# Patient Record
Sex: FEMALE | Race: BLACK OR AFRICAN AMERICAN | NOT HISPANIC OR LATINO | Employment: UNEMPLOYED | URBAN - METROPOLITAN AREA
[De-identification: names, ages, dates, MRNs, and addresses within clinical notes are randomized per-mention and may not be internally consistent; named-entity substitution may affect disease eponyms.]

---

## 2023-09-04 ENCOUNTER — HOSPITAL ENCOUNTER (INPATIENT)
Facility: HOSPITAL | Age: 23
LOS: 9 days | Discharge: HOME | DRG: 885 | End: 2023-09-13
Attending: PSYCHIATRY & NEUROLOGY | Admitting: PSYCHIATRY & NEUROLOGY

## 2023-09-04 ENCOUNTER — HOSPITAL ENCOUNTER (EMERGENCY)
Facility: HOSPITAL | Age: 23
Discharge: PSYCHIATRIC OR SUBSTANCE USE FACILITY | End: 2023-09-04
Attending: STUDENT IN AN ORGANIZED HEALTH CARE EDUCATION/TRAINING PROGRAM

## 2023-09-04 VITALS
BODY MASS INDEX: 31.89 KG/M2 | SYSTOLIC BLOOD PRESSURE: 114 MMHG | DIASTOLIC BLOOD PRESSURE: 78 MMHG | HEIGHT: 66 IN | RESPIRATION RATE: 18 BRPM | HEART RATE: 95 BPM | OXYGEN SATURATION: 100 % | TEMPERATURE: 98 F | WEIGHT: 198.44 LBS

## 2023-09-04 DIAGNOSIS — Z00.8 MEDICAL CLEARANCE FOR PSYCHIATRIC ADMISSION: ICD-10-CM

## 2023-09-04 DIAGNOSIS — F23 ACUTE PSYCHOSIS: Primary | ICD-10-CM

## 2023-09-04 DIAGNOSIS — F29 PSYCHOSIS: ICD-10-CM

## 2023-09-04 DIAGNOSIS — R53.83 FATIGUE: ICD-10-CM

## 2023-09-04 LAB
ALBUMIN SERPL-MCNC: 4.5 G/DL (ref 3.5–5)
ALBUMIN/GLOB SERPL: 1.1 RATIO (ref 1.1–2)
ALP SERPL-CCNC: 61 UNIT/L (ref 40–150)
ALT SERPL-CCNC: 17 UNIT/L (ref 0–55)
AMPHET UR QL SCN: NEGATIVE
APAP SERPL-MCNC: <17.4 UG/ML (ref 17.4–30)
APPEARANCE UR: ABNORMAL
AST SERPL-CCNC: 23 UNIT/L (ref 5–34)
B-HCG FREE SERPL-ACNC: <2.42 MIU/ML
BACTERIA #/AREA URNS AUTO: ABNORMAL /HPF
BARBITURATE SCN PRESENT UR: NEGATIVE
BASOPHILS # BLD AUTO: 0.04 X10(3)/MCL
BASOPHILS NFR BLD AUTO: 0.5 %
BENZODIAZ UR QL SCN: NEGATIVE
BILIRUB SERPL-MCNC: 1.2 MG/DL
BILIRUB UR QL STRIP.AUTO: NEGATIVE
BUN SERPL-MCNC: 15.4 MG/DL (ref 7–18.7)
CALCIUM SERPL-MCNC: 9.9 MG/DL (ref 8.4–10.2)
CANNABINOIDS UR QL SCN: POSITIVE
CHLORIDE SERPL-SCNC: 105 MMOL/L (ref 98–107)
CK MB SERPL-MCNC: 2.6 NG/ML
CK SERPL-CCNC: 419 U/L (ref 29–168)
CO2 SERPL-SCNC: 24 MMOL/L (ref 22–29)
COCAINE UR QL SCN: NEGATIVE
COLOR UR: ABNORMAL
CREAT SERPL-MCNC: 0.86 MG/DL (ref 0.55–1.02)
EOSINOPHIL # BLD AUTO: 0.35 X10(3)/MCL (ref 0–0.9)
EOSINOPHIL NFR BLD AUTO: 3.9 %
ERYTHROCYTE [DISTWIDTH] IN BLOOD BY AUTOMATED COUNT: 13.2 % (ref 11.5–17)
ETHANOL SERPL-MCNC: <10 MG/DL
FENTANYL UR QL SCN: NEGATIVE
FLUAV AG UPPER RESP QL IA.RAPID: NOT DETECTED
FLUBV AG UPPER RESP QL IA.RAPID: NOT DETECTED
GFR SERPLBLD CREATININE-BSD FMLA CKD-EPI: >60 MLS/MIN/1.73/M2
GLOBULIN SER-MCNC: 4 GM/DL (ref 2.4–3.5)
GLUCOSE SERPL-MCNC: 99 MG/DL (ref 74–100)
GLUCOSE UR QL STRIP.AUTO: NORMAL
HCT VFR BLD AUTO: 39.3 % (ref 37–47)
HGB BLD-MCNC: 13.3 G/DL (ref 12–16)
HYALINE CASTS #/AREA URNS LPF: ABNORMAL /LPF
IMM GRANULOCYTES # BLD AUTO: 0.02 X10(3)/MCL (ref 0–0.04)
IMM GRANULOCYTES NFR BLD AUTO: 0.2 %
KETONES UR QL STRIP.AUTO: ABNORMAL
LEUKOCYTE ESTERASE UR QL STRIP.AUTO: NEGATIVE
LYMPHOCYTES # BLD AUTO: 1.94 X10(3)/MCL (ref 0.6–4.6)
LYMPHOCYTES NFR BLD AUTO: 21.9 %
MCH RBC QN AUTO: 31.3 PG (ref 27–31)
MCHC RBC AUTO-ENTMCNC: 33.8 G/DL (ref 33–36)
MCV RBC AUTO: 92.5 FL (ref 80–94)
MDMA UR QL SCN: NEGATIVE
MONOCYTES # BLD AUTO: 0.87 X10(3)/MCL (ref 0.1–1.3)
MONOCYTES NFR BLD AUTO: 9.8 %
MUCOUS THREADS URNS QL MICRO: ABNORMAL /LPF
NEUTROPHILS # BLD AUTO: 5.65 X10(3)/MCL (ref 2.1–9.2)
NEUTROPHILS NFR BLD AUTO: 63.7 %
NITRITE UR QL STRIP.AUTO: NEGATIVE
NRBC BLD AUTO-RTO: 0 %
OPIATES UR QL SCN: NEGATIVE
PCP UR QL: NEGATIVE
PH UR STRIP.AUTO: 6 [PH]
PH UR: 6 [PH] (ref 3–11)
PLATELET # BLD AUTO: 258 X10(3)/MCL (ref 130–400)
PMV BLD AUTO: 10.9 FL (ref 7.4–10.4)
POCT GLUCOSE: 89 MG/DL (ref 70–110)
POTASSIUM SERPL-SCNC: 3.7 MMOL/L (ref 3.5–5.1)
PROT SERPL-MCNC: 8.5 GM/DL (ref 6.4–8.3)
PROT UR QL STRIP.AUTO: ABNORMAL
RBC # BLD AUTO: 4.25 X10(6)/MCL (ref 4.2–5.4)
RBC #/AREA URNS AUTO: ABNORMAL /HPF
RBC UR QL AUTO: NEGATIVE
SALICYLATES SERPL-MCNC: <5 MG/DL (ref 15–30)
SARS-COV-2 RNA RESP QL NAA+PROBE: NOT DETECTED
SODIUM SERPL-SCNC: 141 MMOL/L (ref 136–145)
SP GR UR STRIP.AUTO: 1.04 (ref 1–1.03)
SQUAMOUS #/AREA URNS LPF: ABNORMAL /HPF
TSH SERPL-ACNC: 1.04 UIU/ML (ref 0.35–4.94)
UNIDENT CRYS #/AREA URNS HPF: ABNORMAL /HPF
UROBILINOGEN UR STRIP-ACNC: ABNORMAL
WBC # SPEC AUTO: 8.87 X10(3)/MCL (ref 4.5–11.5)
WBC #/AREA URNS AUTO: ABNORMAL /HPF

## 2023-09-04 PROCEDURE — 80307 DRUG TEST PRSMV CHEM ANLYZR: CPT | Performed by: PHYSICIAN ASSISTANT

## 2023-09-04 PROCEDURE — 82077 ASSAY SPEC XCP UR&BREATH IA: CPT | Performed by: PHYSICIAN ASSISTANT

## 2023-09-04 PROCEDURE — 99285 EMERGENCY DEPT VISIT HI MDM: CPT | Mod: 25

## 2023-09-04 PROCEDURE — 84702 CHORIONIC GONADOTROPIN TEST: CPT | Performed by: PHYSICIAN ASSISTANT

## 2023-09-04 PROCEDURE — 81001 URINALYSIS AUTO W/SCOPE: CPT | Performed by: PHYSICIAN ASSISTANT

## 2023-09-04 PROCEDURE — 80143 DRUG ASSAY ACETAMINOPHEN: CPT | Performed by: PHYSICIAN ASSISTANT

## 2023-09-04 PROCEDURE — 93005 ELECTROCARDIOGRAM TRACING: CPT

## 2023-09-04 PROCEDURE — 85025 COMPLETE CBC W/AUTO DIFF WBC: CPT | Performed by: PHYSICIAN ASSISTANT

## 2023-09-04 PROCEDURE — 0240U COVID/FLU A&B PCR: CPT | Performed by: PHYSICIAN ASSISTANT

## 2023-09-04 PROCEDURE — 11400000 HC PSYCH PRIVATE ROOM

## 2023-09-04 PROCEDURE — 82550 ASSAY OF CK (CPK): CPT | Performed by: PHYSICIAN ASSISTANT

## 2023-09-04 PROCEDURE — 80053 COMPREHEN METABOLIC PANEL: CPT | Performed by: PHYSICIAN ASSISTANT

## 2023-09-04 PROCEDURE — 84443 ASSAY THYROID STIM HORMONE: CPT | Performed by: PHYSICIAN ASSISTANT

## 2023-09-04 PROCEDURE — 25000003 PHARM REV CODE 250: Performed by: PHYSICIAN ASSISTANT

## 2023-09-04 PROCEDURE — 80179 DRUG ASSAY SALICYLATE: CPT | Performed by: PHYSICIAN ASSISTANT

## 2023-09-04 PROCEDURE — 82553 CREATINE MB FRACTION: CPT | Performed by: PHYSICIAN ASSISTANT

## 2023-09-04 PROCEDURE — 96360 HYDRATION IV INFUSION INIT: CPT

## 2023-09-04 RX ORDER — HALOPERIDOL 5 MG/ML
10 INJECTION INTRAMUSCULAR EVERY 4 HOURS PRN
Status: DISCONTINUED | OUTPATIENT
Start: 2023-09-04 | End: 2023-09-13 | Stop reason: HOSPADM

## 2023-09-04 RX ORDER — DIPHENHYDRAMINE HCL 50 MG
50 CAPSULE ORAL EVERY 4 HOURS PRN
Status: DISCONTINUED | OUTPATIENT
Start: 2023-09-04 | End: 2023-09-13 | Stop reason: HOSPADM

## 2023-09-04 RX ORDER — MAG HYDROX/ALUMINUM HYD/SIMETH 200-200-20
30 SUSPENSION, ORAL (FINAL DOSE FORM) ORAL EVERY 6 HOURS PRN
Status: DISCONTINUED | OUTPATIENT
Start: 2023-09-04 | End: 2023-09-13 | Stop reason: HOSPADM

## 2023-09-04 RX ORDER — DIPHENHYDRAMINE HYDROCHLORIDE 50 MG/ML
50 INJECTION INTRAMUSCULAR; INTRAVENOUS EVERY 4 HOURS PRN
Status: DISCONTINUED | OUTPATIENT
Start: 2023-09-04 | End: 2023-09-13 | Stop reason: HOSPADM

## 2023-09-04 RX ORDER — HALOPERIDOL 5 MG/1
10 TABLET ORAL EVERY 4 HOURS PRN
Status: DISCONTINUED | OUTPATIENT
Start: 2023-09-04 | End: 2023-09-13 | Stop reason: HOSPADM

## 2023-09-04 RX ORDER — HYDROXYZINE HYDROCHLORIDE 50 MG/1
50 TABLET, FILM COATED ORAL EVERY 4 HOURS PRN
Status: DISCONTINUED | OUTPATIENT
Start: 2023-09-04 | End: 2023-09-08

## 2023-09-04 RX ORDER — IBUPROFEN 200 MG
1 TABLET ORAL DAILY
Status: DISCONTINUED | OUTPATIENT
Start: 2023-09-05 | End: 2023-09-13 | Stop reason: HOSPADM

## 2023-09-04 RX ORDER — LORAZEPAM 2 MG/ML
2 INJECTION INTRAMUSCULAR EVERY 4 HOURS PRN
Status: DISCONTINUED | OUTPATIENT
Start: 2023-09-04 | End: 2023-09-13 | Stop reason: HOSPADM

## 2023-09-04 RX ORDER — TRAZODONE HYDROCHLORIDE 100 MG/1
100 TABLET ORAL NIGHTLY PRN
Status: DISCONTINUED | OUTPATIENT
Start: 2023-09-04 | End: 2023-09-08

## 2023-09-04 RX ORDER — ACETAMINOPHEN 325 MG/1
650 TABLET ORAL EVERY 6 HOURS PRN
Status: DISCONTINUED | OUTPATIENT
Start: 2023-09-04 | End: 2023-09-13 | Stop reason: HOSPADM

## 2023-09-04 RX ORDER — LORAZEPAM 1 MG/1
2 TABLET ORAL EVERY 4 HOURS PRN
Status: DISCONTINUED | OUTPATIENT
Start: 2023-09-04 | End: 2023-09-13 | Stop reason: HOSPADM

## 2023-09-04 RX ADMIN — SODIUM CHLORIDE 1000 ML: 9 INJECTION, SOLUTION INTRAVENOUS at 12:09

## 2023-09-04 NOTE — ED PROVIDER NOTES
Encounter Date: 9/4/2023       History     Chief Complaint   Patient presents with    Dehydration     Pt brought in by EMS after being found sleeping under a tree. Pt reports she feels dehydrated and fatigued.      Patient reports to the ER with reports of fatigue and feeling dehydrated (per EMS)    The history is provided by the patient and the EMS personnel.   Illness   The current episode started 3 to 5 hours ago. Pertinent negatives include no fever, no vomiting, no headaches, no neck stiffness, no shortness of breath, no rash and no pain. She has received no recent medical care.     Review of patient's allergies indicates:  No Known Allergies  No past medical history on file.  No past surgical history on file.  No family history on file.     Review of Systems   Constitutional:  Negative for fever.   HENT:  Negative for trouble swallowing.    Eyes: Negative.  Negative for pain.   Respiratory:  Negative for shortness of breath.    Cardiovascular:  Negative for chest pain.   Gastrointestinal:  Negative for vomiting.   Genitourinary:  Negative for hematuria.   Musculoskeletal:  Negative for back pain.   Skin:  Negative for rash.   Neurological:  Negative for headaches.   Hematological:  Does not bruise/bleed easily.   Psychiatric/Behavioral:  The patient is not hyperactive.        Physical Exam     Initial Vitals [09/04/23 1055]   BP Pulse Resp Temp SpO2   (!) 150/76 78 16 98.2 °F (36.8 °C) 100 %      MAP       --         Physical Exam    Vitals reviewed.  Constitutional: Vital signs are normal. She appears well-developed and well-nourished. She is uncooperative.   HENT:   Head: Normocephalic and atraumatic.   Eyes: Conjunctivae and EOM are normal. Pupils are equal, round, and reactive to light.   Neck: Neck supple.   Normal range of motion.  Cardiovascular:  Normal rate, regular rhythm and normal heart sounds.           Pulmonary/Chest: Breath sounds normal. No respiratory distress. She has no wheezes. She  "exhibits no tenderness.   Abdominal: Abdomen is soft. Bowel sounds are normal. There is no abdominal tenderness.   Musculoskeletal:         General: Normal range of motion.      Cervical back: Normal range of motion and neck supple.     Neurological: She is alert and oriented to person, place, and time. She displays normal reflexes. No cranial nerve deficit or sensory deficit.   Skin: Skin is warm and dry.   Psychiatric: Judgment normal. Her speech is tangential. She expresses no homicidal ideation. She expresses no homicidal plans.   Patient is uncooperative during HPI and PE, not answering questions and repeatedly saying she needs fluids for her dehydration; when asked what symptoms or complaints she is having to make her think she is dehydrated and requires fluids, pt is unable to answer basic HPI and PE questions repeatedly responding "you should know, you are the doctor" and then she begins to mutter things under her breath as if she is having a conversation with individuals not in the room She is inattentive.       ED Course   Procedures  Labs Reviewed   COMPREHENSIVE METABOLIC PANEL - Abnormal; Notable for the following components:       Result Value    Protein Total 8.5 (*)     Globulin 4.0 (*)     All other components within normal limits   URINALYSIS, REFLEX TO URINE CULTURE - Abnormal; Notable for the following components:    Color, UA Orange (*)     Appearance, UA Turbid (*)     Specific Gravity, UA 1.041 (*)     Protein, UA 3+ (*)     Ketones, UA 1+ (*)     Urobilinogen, UA 1+ (*)     Squamous Epithelial Cells, UA Moderate (*)     Mucous, UA Many (*)     Unclassified Crystal, UA Trace (*)     RBC, UA 6-10 (*)     All other components within normal limits   DRUG SCREEN, URINE (BEAKER) - Abnormal; Notable for the following components:    Cannabinoids, Urine Positive (*)     All other components within normal limits    Narrative:     Cut off concentrations:    Amphetamines - 1000 ng/ml  Barbiturates - 200 " ng/ml  Benzodiazepine - 200 ng/ml  Cannabinoids (THC) - 50 ng/ml  Cocaine - 300 ng/ml  Fentanyl - 1.0 ng/ml  MDMA - 500 ng/ml  Opiates - 300 ng/ml   Phencyclidine (PCP) - 25 ng/ml    Specimen submitted for drug analysis and tested for pH and specific gravity in order to evaluate sample integrity. Suspect tampering if specific gravity is <1.003 and/or pH is not within the range of 4.5 - 8.0  False negatives may result form substances such as bleach added to urine.  False positives may result for the presence of a substance with similar chemical structure to the drug or its metabolite.    This test provides only a PRELIMINARY analytical test result. A more specific alternate chemical method must be used in order to obtain a confirmed analytical result. Gas chromatography/mass spectrometry (GC/MS) is the preferred confirmatory method. Other chemical confirmation methods are available. Clinical consideration and professional judgement should be applied to any drug of abuse test result, particularly when preliminary positive results are used.    Positive results will be confirmed only at the physicians request. Unconfirmed screening results are to be used only for medical purposes (treatment).        ACETAMINOPHEN LEVEL - Abnormal; Notable for the following components:    Acetaminophen Level <17.4 (*)     All other components within normal limits   SALICYLATE LEVEL - Abnormal; Notable for the following components:    Salicylate Level <5.0 (*)     All other components within normal limits   CK - Abnormal; Notable for the following components:    Creatine Kinase 419 (*)     All other components within normal limits   CBC WITH DIFFERENTIAL - Abnormal; Notable for the following components:    MCH 31.3 (*)     MPV 10.9 (*)     All other components within normal limits   TSH - Normal   ALCOHOL,MEDICAL (ETHANOL) - Normal   HCG, QUANTITATIVE - Normal   CK-MB - Normal   COVID/FLU A&B PCR - Normal    Narrative:     The Xpert  Xpress SARS-CoV-2/FLU/RSV plus is a rapid, multiplexed real-time PCR test intended for the simultaneous qualitative detection and differentiation of SARS-CoV-2, Influenza A, Influenza B, and respiratory syncytial virus (RSV) viral RNA in either nasopharyngeal swab or nasal swab specimens.         CBC W/ AUTO DIFFERENTIAL    Narrative:     The following orders were created for panel order CBC auto differential.  Procedure                               Abnormality         Status                     ---------                               -----------         ------                     CBC with Differential[[id number]]        Abnormal            Final result                 Please view results for these tests on the individual orders.   EXTRA TUBES    Narrative:     The following orders were created for panel order EXTRA TUBES.  Procedure                               Abnormality         Status                     ---------                               -----------         ------                     Light Blue Top Hold[[id number]]                              In process                   Please view results for these tests on the individual orders.   LIGHT BLUE TOP HOLD   POCT GLUCOSE, HAND-HELD DEVICE   POCT GLUCOSE     EKG Readings: (Independently Interpreted)   Initial Reading: No STEMI. Rhythm: Normal Sinus Rhythm. Heart Rate: 91. Ectopy: No Ectopy. Conduction: Normal. ST Segments: Normal ST Segments. T Waves: Normal. Clinical Impression: Normal Sinus Rhythm     ECG Results              EKG 12-lead (In process)  Result time 09/04/23 14:40:03      In process by Interface, Lab [facility] (09/04/23 14:40:03)                   Narrative:    Test Reason : R53.83,    Vent. Rate : 091 BPM     Atrial Rate : 091 BPM     P-R Int : 148 ms          QRS Dur : 070 ms      QT Int : 358 ms       P-R-T Axes : 063 076 043 degrees     QTc Int : 440 ms    Normal sinus rhythm  Normal ECG  No previous ECGs available    Referred By:  [email or web address]   SELF           Confirmed By:                                   Imaging Results              CT Head Without Contrast (Final result)  Result time 09/04/23 14:19:26      Final result by Billy Tapia MD (09/04/23 14:19:26)                   Impression:      No acute abnormality seen      Electronically signed by: Billy Tapia  Date:    09/04/2023  Time:    14:19               Narrative:    EXAMINATION:  CT HEAD WITHOUT CONTRAST    CLINICAL HISTORY:  Mental status change, unknown cause;    TECHNIQUE:  Multiple axial images were obtained from the base of the brain to the vertex without contrast administration.  Sagittal and coronal reconstructions were performed. .Automatic exposure control  (AEC) is utilized to reduce patient radiation exposure.    COMPARISON:  None    FINDINGS:  There is no intracranial mass or lesion seen.  No hemorrhage is seen.  No infarct is seen.  The ventricles and basilar cisterns appear normal.  Brain parenchyma appears grossly unremarkable.    Posterior fossa appears normal.  The calvarium is intact.  The paranasal sinuses appear grossly unremarkable.                                       Medications   sodium chloride 0.9% bolus 1,000 mL 1,000 mL (0 mLs Intravenous Stopped 9/4/23 1323)     Medical Decision Making  Amount and/or Complexity of Data Reviewed  Labs: ordered.  Radiology: ordered.         APC / Resident Notes:   Attending attestation.  I agree with Michael's assessment/plan as listed above.  He consulted me on patient with bizarre behavior with grossly normal laboratory workup    History.  Patient provides very poor history.  No friends or family present.  Believes she is dehydrated and stating this is because she did not eat or drink today.  And goes for long periods not answering questions and hallucinating.  No other complaints or concerns at this time.  No trauma reported, no fever chills, no chest pain, etc.    Physical exam.  General: Well  appearing, nontoxic, no acute distress. Resting upon entry.  Head: Normocephalic Atraumatic  Eyes: EOMI  ENT: Airway patent, no stridor  Neck: supple  Chest: Lungs CTAB, no respiratory distress  Cardiac: RRR, no murmurs, rubs or gallops  Abdomen: soft, no rebound / guarding / rigidity to deep palpation.  Musculoskeletal: LE grossly symmetric, nontender  Skin: Normal skin tone  Neuro: No obvious focal deficit, responding to internal stimuli, stares off, one sided conversations.    Assessment/plan.  Patient at this time is grossly disabled.  Unable to give a clear history or actual meaningful conversation with the patient.  Has no focal neuro deficits.  Vitals stable.  Resting comfortably upon my initial examination.  Ultimately performed head CT given acute altered status which returned neg.  Mild elevation of CPK but noncritical rhabdo.  No anion gap, no evidence of infection, no nuchal rigidity.  Patient at this time requires psychiatric evaluation.  PEC'd for patient's safety.  Awaiting acceptance and transfer. (Janak)        ED Course as of 09/04/23 1539   Mon Sep 04, 2023   1345 Transitioned to Dr Briceno [AL]      ED Course User Index  [AL] Michael Queen PA                    Clinical Impression:   Final diagnoses:  [R53.83] Fatigue  [F23] Acute psychosis (Primary)  [Z00.8] Medical clearance for psychiatric admission        ED Disposition Condition    Transfer to Psych Facility Stable          ED Prescriptions    None       Follow-up Information    None          Michael Queen PA  09/04/23 1411       Michael Queen PA  09/04/23 1416       Bridger Briceno MD  09/04/23 [id number]

## 2023-09-05 PROCEDURE — 11400000 HC PSYCH PRIVATE ROOM

## 2023-09-05 PROCEDURE — 25000003 PHARM REV CODE 250: Performed by: PSYCHIATRY & NEUROLOGY

## 2023-09-05 RX ORDER — OLANZAPINE 2.5 MG/1
2.5 TABLET ORAL NIGHTLY
Status: DISCONTINUED | OUTPATIENT
Start: 2023-09-05 | End: 2023-09-13 | Stop reason: HOSPADM

## 2023-09-05 RX ADMIN — HYDROXYZINE HYDROCHLORIDE 50 MG: 50 TABLET, FILM COATED ORAL at 06:09

## 2023-09-05 RX ADMIN — HYDROXYZINE HYDROCHLORIDE 50 MG: 50 TABLET, FILM COATED ORAL at 11:09

## 2023-09-05 NOTE — NURSING
"Daily Nursing Note:      Behavior:    Patient (Gilmer Carmona is a 23 y.o. female, : 2000, MRN: [id number]) demonstrating an affect that was flat and irritable. Gilmer demonstrating mood that is anxious. Gilmer had an appearance that was disheveled and poor hygiene. Gilmer denies suicidal ideation. Gilmer denies suicide plan. Gilmer denies homicidal ideation. Gilmer endorses auditory  hallucinations.    Gilmer's  height is 5' 6" (1.676 m) and weight is 90 kg (198 lb 6.6 oz). Her oral temperature is 98.7 °F (37.1 °C). Her blood pressure is 104/71 and her pulse is 83. Her respiration is 18 and oxygen saturation is 98%.     Kristians last BM was noted on: _2023  ______      Intervention:    Encourage Gilmer to perform self-hygiene, grooming, and changing of clothing. Monitor Gilmer's behavior and program compliance. Monitor Gilmer for suicidal ideation, homicidal ideation, sleep disturbance, and hallucinations. Encourage Gilmer to eat all portions of meals and assess for meal preferences. Monitor Gilmer for intake and output to ensure hydration. Notify the Physician/Physician Assistant/Advance Practice Registered Nurse (MD/PA/APRN) for any medication refusal and any change in patient condition.      Response:    Gilmer verbalizes understand of unit process and procedures. Gilmer reported medications  No mediations.  Attempted to educate on medications prescribed to her, refused teaching.  Stated she doesn't want to start the new medications. ______.      Plan:     Continue to monitor per MD/PA/APRN orders; maintain patient safety.    "

## 2023-09-05 NOTE — NURSING
"PRN Administration Note:    Behavior:    Patient (Gilmer Carmona is a 23 y.o. female, : 2000, MRN: [id number])     Allergies: Patient has no known allergies.    Gilmer's  height is 5' 6" (1.676 m) and weight is 90 kg (198 lb 6.6 oz). Her oral temperature is 98.7 °F (37.1 °C). Her blood pressure is 104/71 and her pulse is 83. Her respiration is 18 and oxygen saturation is 98%.     Reason for PRN Administration: Complaints of anxiety. _________.    Intervention:    Administered _ Atarax 50 mg.,______ per physician order to Gilmer       Response:    Gilmer tolerated administration well.      Plan:     Continue to monitor per MD/PA/APRN orders; and reevaluate medication effectiveness within 30 minutes.    "

## 2023-09-05 NOTE — NURSING
"PRN Medication Follow-up Note:    Behavior:    Patient (Gilmer Carmona is a 23 y.o. female, : 2000, MRN: [id number])     Allergies: Patient has no known allergies.    Gilmer's  height is 5' 6" (1.676 m) and weight is 90 kg (198 lb 6.6 oz). Her oral temperature is 98.7 °F (37.1 °C). Her blood pressure is 104/71 and her pulse is 83. Her respiration is 18 and oxygen saturation is 98%.     Administered _ Atarax 50 mg.,______ per physician order to Gilmer       Intervention:    Intervention to Gilmer's response: Administer medications as ordered. ________.       Response:    Gilmer's response: Decreasing anxiety. ________      Plan:     Continue to monitor per MD/PA/APRN orders; and reevaluate medication effectiveness within 30 minutes.    "

## 2023-09-05 NOTE — NURSING
"PRN Medication Follow-up Note:    Behavior:    Patient (Gilmer Carmona is a 23 y.o. female, : 2000, MRN: [id number])     Allergies: Patient has no known allergies.    Gilmer's  height is 5' 6" (1.676 m) and weight is 89.8 kg (198 lb). Her temperature is 97.9 °F (36.6 °C). Her blood pressure is 106/73 and her pulse is 78. Her respiration is 18 and oxygen saturation is 98%.     Administered _ Atarax 50 mg.,______ per physician order to Gilmer       Intervention:    Intervention to Gilmer's response: Administer medication as ordered. _________.       Response:    Gilmer's response: Decreasing anxiety._________      Plan:     Continue to monitor per MD/PA/APRN orders; and reevaluate medication effectiveness within 30 minutes.    "

## 2023-09-05 NOTE — H&P
[facility]  History & Physical    Subjective:      Chief Complaint/Reason for Admission: psychosis with hallucinations     Gilmer Carmona is a 23 y.o. female. Sent on PEC from ER with hallucinations     No past medical history on file.  No past surgical history on file.  No family history on file.       No medications prior to admission.     Review of patient's allergies indicates:  No Known Allergies     Review of Systems   Constitutional: Negative.    HENT: Negative.     Eyes: Negative.    Respiratory: Negative.     Cardiovascular: Negative.    Gastrointestinal: Negative.    Genitourinary: Negative.    Musculoskeletal: Negative.    Skin: Negative.    Neurological: Negative.    Endo/Heme/Allergies: Negative.    Psychiatric/Behavioral:  Positive for depression and hallucinations. Negative for substance abuse and suicidal ideas.        Objective:      Vital Signs (Most Recent)  Temp: 97.9 °F (36.6 °C) (09/05/23 1637)  Pulse: 78 (09/05/23 1637)  Resp: 18 (09/05/23 1637)  BP: 106/73 (09/05/23 1637)  SpO2: 98 % (09/05/23 1100)    Vital Signs Range (Last 24H):  Temp:  [97.9 °F (36.6 °C)-98.7 °F (37.1 °C)]   Pulse:  []   Resp:  [18]   BP: (104-127)/(70-85)   SpO2:  [98 %-100 %]     Physical Exam  HENT:      Head: Normocephalic.      Right Ear: Tympanic membrane normal.      Left Ear: Tympanic membrane normal.      Nose: Nose normal.      Mouth/Throat:      Mouth: Mucous membranes are moist.   Eyes:      Extraocular Movements: Extraocular movements intact.      Pupils: Pupils are equal, round, and reactive to light.   Cardiovascular:      Rate and Rhythm: Normal rate and regular rhythm.   Pulmonary:      Effort: Pulmonary effort is normal.   Abdominal:      General: Abdomen is flat.   Musculoskeletal:         General: Normal range of motion.   Skin:     General: Skin is warm.   Neurological:      General: No focal deficit present.      Mental Status: She is alert and oriented to  person, place, and time.      Comments: Vision normal   Hearing normal   EOM intact   Face muscles normal  Facial sensation normal   Shrugs shoulders  Tongue midline            Data Review:    Recent Results (from the past 48 hour(s))   POCT glucose    Collection Time: 09/04/23 11:53 AM   Result Value Ref Range    POCT Glucose 89 70 - 110 mg/dL   COVID/FLU A&B PCR    Collection Time: 09/04/23 12:03 PM   Result Value Ref Range    Influenza A PCR Not Detected Not Detected    Influenza B PCR Not Detected Not Detected    SARS-CoV-2 PCR Not Detected Not Detected, Negative, Invalid   Comprehensive metabolic panel    Collection Time: 09/04/23 12:14 PM   Result Value Ref Range    Sodium Level 141 136 - 145 mmol/L    Potassium Level 3.7 3.5 - 5.1 mmol/L    Chloride 105 98 - 107 mmol/L    Carbon Dioxide 24 22 - 29 mmol/L    Glucose Level 99 74 - 100 mg/dL    Blood Urea Nitrogen 15.4 7.0 - 18.7 mg/dL    Creatinine 0.86 0.55 - 1.02 mg/dL    Calcium Level Total 9.9 8.4 - 10.2 mg/dL    Protein Total 8.5 (H) 6.4 - 8.3 gm/dL    Albumin Level 4.5 3.5 - 5.0 g/dL    Globulin 4.0 (H) 2.4 - 3.5 gm/dL    Albumin/Globulin Ratio 1.1 1.1 - 2.0 ratio    Bilirubin Total 1.2 <=1.5 mg/dL    Alkaline Phosphatase 61 40 - 150 unit/L    Alanine Aminotransferase 17 0 - 55 unit/L    Aspartate Aminotransferase 23 5 - 34 unit/L    eGFR >60 mls/min/1.73/m2   TSH    Collection Time: 09/04/23 12:14 PM   Result Value Ref Range    Thyroid Stimulating Hormone 1.037 0.350 - 4.940 uIU/mL   Ethanol    Collection Time: 09/04/23 12:14 PM   Result Value Ref Range    Ethanol Level <10.0 <=10.0 mg/dL   Acetaminophen level    Collection Time: 09/04/23 12:14 PM   Result Value Ref Range    Acetaminophen Level <17.4 (L) 17.4 - 30.0 ug/ml   Salicylate level    Collection Time: 09/04/23 12:14 PM   Result Value Ref Range    Salicylate Level <5.0 (L) 15.0 - 30.0 mg/dL   hCG, quantitative, pregnancy    Collection Time: 09/04/23 12:14 PM   Result Value Ref Range    Beta Human  Chorionic Gonadotropin Quantitative <2.42 <=5.00 mIU/mL   CPK    Collection Time: 09/04/23 12:14 PM   Result Value Ref Range    Creatine Kinase 419 (H) 29 - 168 U/L   CK-MB    Collection Time: 09/04/23 12:14 PM   Result Value Ref Range    Creatine Kinase MB 2.6 <=3.4 ng/mL   CBC with Differential    Collection Time: 09/04/23 12:14 PM   Result Value Ref Range    WBC 8.87 4.50 - 11.50 x10(3)/mcL    RBC 4.25 4.20 - 5.40 x10(6)/mcL    Hgb 13.3 12.0 - 16.0 g/dL    Hct 39.3 37.0 - 47.0 %    MCV 92.5 80.0 - 94.0 fL    MCH 31.3 (H) 27.0 - 31.0 pg    MCHC 33.8 33.0 - 36.0 g/dL    RDW 13.2 11.5 - 17.0 %    Platelet 258 130 - 400 x10(3)/mcL    MPV 10.9 (H) 7.4 - 10.4 fL    Neut % 63.7 %    Lymph % 21.9 %    Mono % 9.8 %    Eos % 3.9 %    Basophil % 0.5 %    Lymph # 1.94 0.6 - 4.6 x10(3)/mcL    Neut # 5.65 2.1 - 9.2 x10(3)/mcL    Mono # 0.87 0.1 - 1.3 x10(3)/mcL    Eos # 0.35 0 - 0.9 x10(3)/mcL    Baso # 0.04 <=0.2 x10(3)/mcL    IG# 0.02 0 - 0.04 x10(3)/mcL    IG% 0.2 %    NRBC% 0.0 %   Urinalysis, Reflex to Urine Culture    Collection Time: 09/04/23 12:18 PM    Specimen: Urine   Result Value Ref Range    Color, UA Orange (A) Yellow, Light-Yellow, Dark Yellow, Claudia, Straw    Appearance, UA Turbid (A) Clear    Specific Gravity, UA 1.041 (H) 1.005 - 1.030    pH, UA 6.0 5.0 - 8.5    Protein, UA 3+ (A) Negative    Glucose, UA Normal Negative, Normal    Ketones, UA 1+ (A) Negative    Blood, UA Negative Negative    Bilirubin, UA Negative Negative    Urobilinogen, UA 1+ (A) 0.2, 1.0, Normal    Nitrites, UA Negative Negative    Leukocyte Esterase, UA Negative Negative    WBC, UA 0-5 None Seen, 0-2, 3-5, 0-5 /HPF    Bacteria, UA None Seen None Seen /HPF    Squamous Epithelial Cells, UA Moderate (A) None Seen /HPF    Mucous, UA Many (A) None Seen /LPF    Hyaline Casts, UA None Seen None Seen /lpf    Unclassified Crystal, UA Trace (A) None Seen /HPF    RBC, UA 6-10 (A) None Seen, 0-2, 3-5, 0-5 /HPF   Drug Screen, Urine    Collection  Time: 09/04/23 12:18 PM   Result Value Ref Range    Amphetamines, Urine Negative Negative    Barbituates, Urine Negative Negative    Benzodiazepine, Urine Negative Negative    Cannabinoids, Urine Positive (A) Negative    Cocaine, Urine Negative Negative    Fentanyl, Urine Negative Negative    MDMA, Urine Negative Negative    Opiates, Urine Negative Negative    Phencyclidine, Urine Negative Negative    pH, Urine 6.0 3.0 - 11.0        CT Head Without Contrast    Result Date: 9/4/2023  EXAMINATION: CT HEAD WITHOUT CONTRAST CLINICAL HISTORY: Mental status change, unknown cause; TECHNIQUE: Multiple axial images were obtained from the base of the brain to the vertex without contrast administration.  Sagittal and coronal reconstructions were performed. .Automatic exposure control  (AEC) is utilized to reduce patient radiation exposure. COMPARISON: None FINDINGS: There is no intracranial mass or lesion seen.  No hemorrhage is seen.  No infarct is seen.  The ventricles and basilar cisterns appear normal.  Brain parenchyma appears grossly unremarkable. Posterior fossa appears normal.  The calvarium is intact.  The paranasal sinuses appear grossly unremarkable.     No acute abnormality seen Electronically signed by: Billy Tapia Date:    09/04/2023 Time:    14:19         Assessment and Plan       Psychosis with hallucinations

## 2023-09-05 NOTE — H&P
"9/5/2023  Gilmer Carmona   2000   [id number]            Psychiatry Inpatient Admission Note    Date of Admission: 9/4/2023  9:00 PM    Current Legal Status: Physician's Emergency Certificate    Chief Complaint: "I don't know why I'm here"     SUBJECTIVE:   History of Present Illness:   Gilmer Carmona is a 23 y.o. female placed under a PEC at [facility] after being found sleeping under a tree. She told the ED that she was dehydrated and needed fluid. Patient states that she does not know why they sent her here,but endorses that they told her she was coming here to be evaluated. She states that there is nothing wrong with her. Upon further pressing she states that she was in a mental health facility in [place] two weeks ago but for a different reason, however she would not discuss the reason. Patient states that she is taking Vraylar but did not know or would not discuss what she has been diagnosed with in the past. Patient states that she is from [place] where she sees a Dr there for her mental health. She states that her mother is sick and this is what brought her to [place]. She endorses that her mother is crazy and has multiple mental health issues, again she refused to discuss further what issues this included. Patient was focused on being discharged and minimizing her symptoms. She was calm and pleasant during this interview but very guarded. She asked to remain on her Vraylar although she did also mention that she only takes it to make people happy because she does not need any medication. Will admit for medication management and safety monitoring.         Past Psychiatric History:   Previous Psychiatric Hospitalizations: Multiple times. Most recently in [place] two weeks ago   Previous Medication Trials: Multiple  Previous Suicide Attempts: Denies   Outpatient psychiatrist:  in [place], [place]    Past Medical/Surgical History:   No past medical history on file.  No past surgical history on file.      Family " "Psychiatric History:   Mother - "Everything"     Allergies:   Review of patient's allergies indicates:  No Known Allergies    Substance Abuse History:   Tobacco: 1 PPD  Alcohol: Wine on occasion  Illicit Substances: THC  Treatment: Denies      Current Medications:   Home Psychiatric Meds: Vraylar    Scheduled Meds:    nicotine  1 patch Transdermal Daily      PRN Meds: acetaminophen, aluminum-magnesium hydroxide-simethicone, haloperidoL **AND** diphenhydrAMINE **AND** LORazepam **AND** haloperidol lactate **AND** diphenhydrAMINE **AND** lorazepam, hydrOXYzine HCL, traZODone   Psychotherapeutics (From admission, onward)      Start     Stop Route Frequency Ordered    09/04/23 2102  haloperidoL tablet 10 mg  (Med - Acute  Behavioral Management)        See Hyperspace for full Linked Orders Report.    -- Oral Every 4 hours PRN 09/04/23 2102    09/04/23 2102  LORazepam tablet 2 mg  (Med - Acute  Behavioral Management)        See Hyperspace for full Linked Orders Report.    -- Oral Every 4 hours PRN 09/04/23 2102    09/04/23 2102  haloperidol lactate injection 10 mg  (Med - Acute  Behavioral Management)        See Hyperspace for full Linked Orders Report.    -- IM Every 4 hours PRN 09/04/23 2102    09/04/23 2102  LORazepam injection 2 mg  (Med - Acute  Behavioral Management)        See Hyperspace for full Linked Orders Report.    -- IM Every 4 hours PRN 09/04/23 2102    09/04/23 2102  traZODone tablet 100 mg         -- Oral Nightly PRN 09/04/23 2102              Social History:  Housing Status: Lived alone in [place]. Stays with sister here.  Relationship Status/Sexual Orientation: Single   Children: Two  Education: College - "Degree in everything"  Employment Status/Info: Unemployed    history: Denies  History of physical/sexual abuse: "Not quite"   Access to gun: Denies       Legal History:   Past Charges/Incarcerations: A few times   Pending charges: Denies      OBJECTIVE:   Medical Review Of Systems:  A comprehensive " review of systems was negative.    Vitals   Vitals:    09/04/23 2211   BP: 127/85   Pulse: (!) 117   Resp: 18   Temp: 98 °F (36.7 °C)        Labs/Imaging/Studies:   Recent Results (from the past 48 hour(s))   POCT glucose    Collection Time: 09/04/23 11:53 AM   Result Value Ref Range    POCT Glucose 89 70 - 110 mg/dL   COVID/FLU A&B PCR    Collection Time: 09/04/23 12:03 PM   Result Value Ref Range    Influenza A PCR Not Detected Not Detected    Influenza B PCR Not Detected Not Detected    SARS-CoV-2 PCR Not Detected Not Detected, Negative, Invalid   Comprehensive metabolic panel    Collection Time: 09/04/23 12:14 PM   Result Value Ref Range    Sodium Level 141 136 - 145 mmol/L    Potassium Level 3.7 3.5 - 5.1 mmol/L    Chloride 105 98 - 107 mmol/L    Carbon Dioxide 24 22 - 29 mmol/L    Glucose Level 99 74 - 100 mg/dL    Blood Urea Nitrogen 15.4 7.0 - 18.7 mg/dL    Creatinine 0.86 0.55 - 1.02 mg/dL    Calcium Level Total 9.9 8.4 - 10.2 mg/dL    Protein Total 8.5 (H) 6.4 - 8.3 gm/dL    Albumin Level 4.5 3.5 - 5.0 g/dL    Globulin 4.0 (H) 2.4 - 3.5 gm/dL    Albumin/Globulin Ratio 1.1 1.1 - 2.0 ratio    Bilirubin Total 1.2 <=1.5 mg/dL    Alkaline Phosphatase 61 40 - 150 unit/L    Alanine Aminotransferase 17 0 - 55 unit/L    Aspartate Aminotransferase 23 5 - 34 unit/L    eGFR >60 mls/min/1.73/m2   TSH    Collection Time: 09/04/23 12:14 PM   Result Value Ref Range    Thyroid Stimulating Hormone 1.037 0.350 - 4.940 uIU/mL   Ethanol    Collection Time: 09/04/23 12:14 PM   Result Value Ref Range    Ethanol Level <10.0 <=10.0 mg/dL   Acetaminophen level    Collection Time: 09/04/23 12:14 PM   Result Value Ref Range    Acetaminophen Level <17.4 (L) 17.4 - 30.0 ug/ml   Salicylate level    Collection Time: 09/04/23 12:14 PM   Result Value Ref Range    Salicylate Level <5.0 (L) 15.0 - 30.0 mg/dL   hCG, quantitative, pregnancy    Collection Time: 09/04/23 12:14 PM   Result Value Ref Range    Beta Human Chorionic Gonadotropin  Quantitative <2.42 <=5.00 mIU/mL   CPK    Collection Time: 09/04/23 12:14 PM   Result Value Ref Range    Creatine Kinase 419 (H) 29 - 168 U/L   CK-MB    Collection Time: 09/04/23 12:14 PM   Result Value Ref Range    Creatine Kinase MB 2.6 <=3.4 ng/mL   CBC with Differential    Collection Time: 09/04/23 12:14 PM   Result Value Ref Range    WBC 8.87 4.50 - 11.50 x10(3)/mcL    RBC 4.25 4.20 - 5.40 x10(6)/mcL    Hgb 13.3 12.0 - 16.0 g/dL    Hct 39.3 37.0 - 47.0 %    MCV 92.5 80.0 - 94.0 fL    MCH 31.3 (H) 27.0 - 31.0 pg    MCHC 33.8 33.0 - 36.0 g/dL    RDW 13.2 11.5 - 17.0 %    Platelet 258 130 - 400 x10(3)/mcL    MPV 10.9 (H) 7.4 - 10.4 fL    Neut % 63.7 %    Lymph % 21.9 %    Mono % 9.8 %    Eos % 3.9 %    Basophil % 0.5 %    Lymph # 1.94 0.6 - 4.6 x10(3)/mcL    Neut # 5.65 2.1 - 9.2 x10(3)/mcL    Mono # 0.87 0.1 - 1.3 x10(3)/mcL    Eos # 0.35 0 - 0.9 x10(3)/mcL    Baso # 0.04 <=0.2 x10(3)/mcL    IG# 0.02 0 - 0.04 x10(3)/mcL    IG% 0.2 %    NRBC% 0.0 %   Urinalysis, Reflex to Urine Culture    Collection Time: 09/04/23 12:18 PM    Specimen: Urine   Result Value Ref Range    Color, UA Orange (A) Yellow, Light-Yellow, Dark Yellow, Claudia, Straw    Appearance, UA Turbid (A) Clear    Specific Gravity, UA 1.041 (H) 1.005 - 1.030    pH, UA 6.0 5.0 - 8.5    Protein, UA 3+ (A) Negative    Glucose, UA Normal Negative, Normal    Ketones, UA 1+ (A) Negative    Blood, UA Negative Negative    Bilirubin, UA Negative Negative    Urobilinogen, UA 1+ (A) 0.2, 1.0, Normal    Nitrites, UA Negative Negative    Leukocyte Esterase, UA Negative Negative    WBC, UA 0-5 None Seen, 0-2, 3-5, 0-5 /HPF    Bacteria, UA None Seen None Seen /HPF    Squamous Epithelial Cells, UA Moderate (A) None Seen /HPF    Mucous, UA Many (A) None Seen /LPF    Hyaline Casts, UA None Seen None Seen /lpf    Unclassified Crystal, UA Trace (A) None Seen /HPF    RBC, UA 6-10 (A) None Seen, 0-2, 3-5, 0-5 /HPF   Drug Screen, Urine    Collection Time: 09/04/23 12:18 PM  "  Result Value Ref Range    Amphetamines, Urine Negative Negative    Barbituates, Urine Negative Negative    Benzodiazepine, Urine Negative Negative    Cannabinoids, Urine Positive (A) Negative    Cocaine, Urine Negative Negative    Fentanyl, Urine Negative Negative    MDMA, Urine Negative Negative    Opiates, Urine Negative Negative    Phencyclidine, Urine Negative Negative    pH, Urine 6.0 3.0 - 11.0      No results found for: "PHENYTOIN", "PHENOBARB", "VALPROATE", "CBMZ"        Psychiatric Mental Status Exam:  General Appearance: appears stated age, well developed and nourished, adequately groomed and appropriately dressed, in no acute distress  Arousal: alert with clear sensorium  Behavior: pleasant, polite, appropriate eye-contact, under good behavioral control, uncooperative  Movements and Motor Activity: no abnormal involuntary movements noted; no tics, no tremors, no akathisia, no dystonia, no evidence of tardive dyskinesia; no psychomotor agitation or retardation  Orientation: intact; oriented fully to person, place, time and situation  Speech: intact; normal rate, rhythm, volume, tone and pitch; conversational, spontaneous, and coherent  Mood: Anxious and Guarded  Affect: anxious, irritable, calm  Thought Process: linear, goal-directed, organized, circumstantial  Associations: intact, no loosening of associations  Thought Content and Perceptions: no suicidal or homicidal ideation, no auditory or visual hallucinations, no paranoid ideation, no ideas of reference, no evidence of delusions or psychosis  Recent and Remote Memory: grossly intact, able to recall relevant and salient information from the recent and remote past; per interview/observation with patient  Attention and Concentration: grossly intact, attentive to the conversation and not readily distractible; per interview/observation with patient  Fund of Knowledge: grossly intact, used appropriate vocabulary and demonstrated an awareness of current " events; based on history, vocabulary, fund of knowledge, syntax, grammar, and content  Insight: questionable; based on understanding of severity of illness and HPI  Judgment: questionable; based on patient's behavior and HPI      Patient Strengths:  Access to care, Able to verbalize needs, Stable physical health, and Capable of independent living      Patient Liabilities:  Anxiety, Lack of social support, Psychosis, Chronic psychiatric illness, Financial stressors, and Housing stressors      Discharge Criteria:  Improved mood, Improved thought process, Improved coping skills, Improved health maintenance, Decreased anxiety, and Improved social functioning      Reason for Admission:  The patient poses a significant and immediate danger to self., The patient presents with psychiatric symptoms of sufficient severity to bring about significant or profound impairment of day to day psychological, social, vocational, and/or educational functioning., and To stabilize the decompensation of a mental disorder that severely interferes with patient's functioning.    ASSESSMENT/PLAN:   Diagnoses:  SCHIZOPHRENIA AND OTHER PSYCHOTIC DISORDERS; Psychotic Disorder NOS  (F29)      No past medical history on file.       Problem lists and Management Plans:  -Admit to [facility]    Psychosis  -Restart Vraylar 1.5 mg PO QD    -Will attempt to obtain outside psychiatric records if available  - to assist with aftercare planning and collateral  -Continue inpatient treatment as evidenced by significant psychotic thought disorder and danger to self      Estimated length of stay: 5-7    Estimated Disposition: Home    Estimated Follow-up: Outpatient medication management      On this date, I have reviewed the medical history and Nursing Assessment, as well as records from referral source.  I have evaluated the mental status of the above named person and concur with the findings of all assessments.  I have provided medical direction for the  development of the Treatment Plan.    I conclude that this patient meets admission criteria for inpatient treatment.  I certify that this patient poses a danger to self or others, or would otherwise be considered gravely disabled based on this assessment and/or provided collateral information.     I have provided medical direction for the development of the Treatment plan.  These services will be provided while this patient is under my care and will be based on an individualized plan of care.  The patient can demonstrate a reasonable expectation of improvement in his/her disorder as a result of the active treatment being provided.      Liam Peoples [facility]

## 2023-09-05 NOTE — PROGRESS NOTES
RichardBarrie refused both TR groups despite encouragement; Alternative materials were offered.   09/05/23 1400   [facility] Group Therapy   Group Name Therapeutic Recreation   Specific Interventions Skilled Activity Creative Expression   Participation Level None   Participation Quality Refused

## 2023-09-05 NOTE — NURSING
"Admission Note:    Gilmer Carmona is a 23 y.o. female, : 2000, MRN: [id number], admitted on 2023 to [facility] ([facility]) for Sergio Morrow MD with a diagnosis of Psychosis [F29]. Patient admitted on a status of Physician Emergency Certificate (PEC). Gilmer reports no known food or drug allergies.    Patient demonstrated an affect that was anxious, irritable, agitated,  labile, and inappropriate. Patient demonstrated mood during assessment that was angry and anxious. Patient had an appearance that was disheveled and poor hygiene.  Patient denies suicidal ideation. Patient denies suicide plan. Patient denies hallucinations.    Kristians  height is 5' 6" (1.676 m) and weight is 90 kg (198 lb 6.6 oz). Her oral temperature is 98 °F (36.7 °C). Her blood pressure is 127/85 and her pulse is 117 (abnormal).       Metal detector screening performed via security personnel. The result of the scan was negative. Head-to-toe physical assessment completed with the following findings:  No contraband  found upon body screen. A full skin assessment was performed. Kristians skin appeared _intact , wnl.  Gilmer was oriented to unit, staff, peers, and room. Patient belongings/valuables stored in locked intake room cabinet and changes of clothing provided to patient. Gilmer was placed on Q 15 min observations.      "

## 2023-09-06 PROBLEM — F29 PSYCHOTIC DISORDER: Status: ACTIVE | Noted: 2023-09-06

## 2023-09-06 PROBLEM — F12.20 CANNABIS DEPENDENCE, CONTINUOUS: Status: ACTIVE | Noted: 2023-09-06

## 2023-09-06 LAB
ALBUMIN SERPL-MCNC: 3.6 G/DL (ref 3.5–5)
ALBUMIN/GLOB SERPL: 1.2 RATIO (ref 1.1–2)
ALP SERPL-CCNC: 52 UNIT/L (ref 40–150)
ALT SERPL-CCNC: 13 UNIT/L (ref 0–55)
AST SERPL-CCNC: 17 UNIT/L (ref 5–34)
BILIRUB SERPL-MCNC: 0.4 MG/DL
BUN SERPL-MCNC: 12.4 MG/DL (ref 7–18.7)
CALCIUM SERPL-MCNC: 8.8 MG/DL (ref 8.4–10.2)
CHLORIDE SERPL-SCNC: 104 MMOL/L (ref 98–107)
CHOLEST SERPL-MCNC: 96 MG/DL
CHOLEST/HDLC SERPL: 3 {RATIO} (ref 0–5)
CO2 SERPL-SCNC: 29 MMOL/L (ref 22–29)
CREAT SERPL-MCNC: 0.83 MG/DL (ref 0.55–1.02)
EST. AVERAGE GLUCOSE BLD GHB EST-MCNC: 96.8 MG/DL
GFR SERPLBLD CREATININE-BSD FMLA CKD-EPI: >60 MLS/MIN/1.73/M2
GLOBULIN SER-MCNC: 3.1 GM/DL (ref 2.4–3.5)
GLUCOSE SERPL-MCNC: 97 MG/DL (ref 74–100)
HBA1C MFR BLD: 5 %
HDLC SERPL-MCNC: 33 MG/DL (ref 35–60)
LDLC SERPL CALC-MCNC: 55 MG/DL (ref 50–140)
POTASSIUM SERPL-SCNC: 4.2 MMOL/L (ref 3.5–5.1)
PROT SERPL-MCNC: 6.7 GM/DL (ref 6.4–8.3)
SODIUM SERPL-SCNC: 140 MMOL/L (ref 136–145)
T PALLIDUM AB SER QL: NONREACTIVE
TRIGL SERPL-MCNC: 41 MG/DL (ref 37–140)
TSH SERPL-ACNC: 0.52 UIU/ML (ref 0.35–4.94)
VLDLC SERPL CALC-MCNC: 8 MG/DL

## 2023-09-06 PROCEDURE — 25000003 PHARM REV CODE 250: Performed by: PSYCHIATRY & NEUROLOGY

## 2023-09-06 PROCEDURE — 11400000 HC PSYCH PRIVATE ROOM

## 2023-09-06 PROCEDURE — 25000003 PHARM REV CODE 250

## 2023-09-06 PROCEDURE — 63600175 PHARM REV CODE 636 W HCPCS: Performed by: PSYCHIATRY & NEUROLOGY

## 2023-09-06 PROCEDURE — 80053 COMPREHEN METABOLIC PANEL: CPT | Performed by: PSYCHIATRY & NEUROLOGY

## 2023-09-06 PROCEDURE — 80061 LIPID PANEL: CPT | Performed by: PSYCHIATRY & NEUROLOGY

## 2023-09-06 PROCEDURE — 83036 HEMOGLOBIN GLYCOSYLATED A1C: CPT | Performed by: PSYCHIATRY & NEUROLOGY

## 2023-09-06 PROCEDURE — 86780 TREPONEMA PALLIDUM: CPT | Performed by: PSYCHIATRY & NEUROLOGY

## 2023-09-06 PROCEDURE — 84443 ASSAY THYROID STIM HORMONE: CPT | Performed by: PSYCHIATRY & NEUROLOGY

## 2023-09-06 RX ADMIN — HYDROXYZINE HYDROCHLORIDE 50 MG: 50 TABLET, FILM COATED ORAL at 06:09

## 2023-09-06 RX ADMIN — OLANZAPINE 2.5 MG: 2.5 TABLET, FILM COATED ORAL at 08:09

## 2023-09-06 RX ADMIN — DIPHENHYDRAMINE HYDROCHLORIDE 50 MG: 50 INJECTION INTRAMUSCULAR; INTRAVENOUS at 10:09

## 2023-09-06 RX ADMIN — HALOPERIDOL LACTATE 10 MG: 5 INJECTION, SOLUTION INTRAMUSCULAR at 10:09

## 2023-09-06 RX ADMIN — LORAZEPAM 2 MG: 2 INJECTION INTRAMUSCULAR; INTRAVENOUS at 10:09

## 2023-09-06 NOTE — PLAN OF CARE
Torsten refused to attend TR groups and TR evaluation x2 despite encouragement, does not interact with peers nor staff, and attends her ADL's.    Torsten attended treatment team, was guarded with no insight and focus on discharge, and attended her ADL's.

## 2023-09-06 NOTE — PROGRESS NOTES
"2023  Gilmer Carmona   2000   [id number]        Psychiatry Progress Note     Chief Complaint: "All right"    SUBJECTIVE:   Gilmer Carmona is a 23 y.o. female  placed under a PEC at [facility] after being found sleeping under a tree and responding to internal stimuli in the ED.    Patient states that she has been in [place] since March and had been living with her mother in her grandmother's house (grandmother ), but is now living with her sister..  She was in [place] prior and was being treated for psychiatric illness.  She had not established care with a mental health provider here.  Sleeping and eating appropriately per patient.  Has not spoken to anyone in her family since her arrival but states that she needs to call her sister.    Of note, she did not receive the Zyprexa that was started yesterday.  She has spent her time here sleeping thus far.  Will f/u with progress and will augment care as needed.    UDS: (+)cannabis  Blood alcohol: <10     Current Medications:   Scheduled Meds:    nicotine  1 patch Transdermal Daily    OLANZapine  2.5 mg Oral QHS      PRN Meds: acetaminophen, aluminum-magnesium hydroxide-simethicone, haloperidoL **AND** diphenhydrAMINE **AND** LORazepam **AND** haloperidol lactate **AND** diphenhydrAMINE **AND** lorazepam, hydrOXYzine HCL, traZODone   Psychotherapeutics (From admission, onward)      Start     Stop Route Frequency Ordered    23 2100  OLANZapine tablet 2.5 mg         -- Oral Nightly 23 1012    23 210  haloperidoL tablet 10 mg  (Med - Acute  Behavioral Management)        See Hyperspace for full Linked Orders Report.    -- Oral Every 4 hours PRN 23 2102    23 210  LORazepam tablet 2 mg  (Med - Acute  Behavioral Management)        See Hyperspace for full Linked Orders Report.    -- Oral Every 4 hours PRN 23 2102    23  haloperidol lactate injection 10 mg  (Med - Acute  Behavioral Management)        See Hyperspace for full " Linked Orders Report.    -- IM Every 4 hours PRN 09/04/23 2102 09/04/23 2102  LORazepam injection 2 mg  (Med - Acute  Behavioral Management)        See Hyperspace for full Linked Orders Report.    -- IM Every 4 hours PRN 09/04/23 2102 09/04/23 2102  traZODone tablet 100 mg         -- Oral Nightly PRN 09/04/23 2102            Allergies:   Review of patient's allergies indicates:  No Known Allergies     OBJECTIVE:   Vitals   Vitals:    09/05/23 1637   BP: 106/73   Pulse: 78   Resp: 18   Temp: 97.9 °F (36.6 °C)        Labs/Imaging/Studies:   Recent Results (from the past 36 hour(s))   Hemoglobin A1C    Collection Time: 09/06/23  6:18 AM   Result Value Ref Range    Hemoglobin A1c 5.0 <=7.0 %    Estimated Average Glucose 96.8 mg/dL   TSH    Collection Time: 09/06/23  6:18 AM   Result Value Ref Range    Thyroid Stimulating Hormone 0.519 0.350 - 4.940 uIU/mL   Lipid Panel    Collection Time: 09/06/23  6:18 AM   Result Value Ref Range    Cholesterol Total 96 <=200 mg/dL    HDL Cholesterol 33 (L) 35 - 60 mg/dL    Triglyceride 41 37 - 140 mg/dL    Cholesterol/HDL Ratio 3 0 - 5    Very Low Density Lipoprotein 8     LDL Cholesterol 55.00 50.00 - 140.00 mg/dL   Comprehensive Metabolic Panel    Collection Time: 09/06/23  6:18 AM   Result Value Ref Range    Sodium Level 140 136 - 145 mmol/L    Potassium Level 4.2 3.5 - 5.1 mmol/L    Chloride 104 98 - 107 mmol/L    Carbon Dioxide 29 22 - 29 mmol/L    Glucose Level 97 74 - 100 mg/dL    Blood Urea Nitrogen 12.4 7.0 - 18.7 mg/dL    Creatinine 0.83 0.55 - 1.02 mg/dL    Calcium Level Total 8.8 8.4 - 10.2 mg/dL    Protein Total 6.7 6.4 - 8.3 gm/dL    Albumin Level 3.6 3.5 - 5.0 g/dL    Globulin 3.1 2.4 - 3.5 gm/dL    Albumin/Globulin Ratio 1.2 1.1 - 2.0 ratio    Bilirubin Total 0.4 <=1.5 mg/dL    Alkaline Phosphatase 52 40 - 150 unit/L    Alanine Aminotransferase 13 0 - 55 unit/L    Aspartate Aminotransferase 17 5 - 34 unit/L    eGFR >60 mls/min/1.73/m2          Medical Review Of  "Systems:  Constitutional: negative  Respiratory: negative  Cardiovascular: negative  Gastrointestinal: negative  Genitourinary:negative  Musculoskeletal:negative  Neurological: negative      Psychiatric Mental Status Exam:  General Appearance: appears stated age, well-developed, well-nourished  Arousal: alert  Behavior: somewhat guarded  Movements and Motor Activity: no abnormal involuntary movements noted  Orientation: oriented to person, place, time, and situation  Speech: normal rhythm, normal tone, soft-spoken, slowed  Mood: "Ok"  Affect: blunted  Thought Process: grossly linear  Associations: intact  Thought Content and Perceptions: recent responding to internal stimuli, no acute suicidal ideation, no homicidal ideation  Recent and Remote Memory: fair; per interview/observation with patient  Attention and Concentration: intact, attentive to conversation; per interview/observation with patient  Fund of Knowledge: fair, aware of current events, vocabulary appropriate; based on history, vocabulary, fund of knowledge, syntax, grammar, and content  Insight: questionable; based on understanding of severity of illness and HPI  Judgment: questionable; based on patient's behavior and HPI      ASSESSMENT/PLAN:   Problems Addressed/Diagnoses:  Unspecified Psychotic Disorder (F29)  Cannabis use disorder (F12.20)    No past medical history on file.     Plan:  Psychosis  -Continue Zyprexa (has not yet taken)    Cannabis use  -Group/Individual psychotherapy      Expected Disposition Plan: Home        Sergio Morrow M.D.  "

## 2023-09-06 NOTE — NURSING
"PRN Administration Note:    Behavior:    Patient (Gilmer Carmona is a 23 y.o. female, : 2000, MRN: [id number])     Allergies: Patient has no known allergies.    Gilmer's  height is 5' 6" (1.676 m) and weight is 89.8 kg (198 lb). Her temperature is 98.2 °F (36.8 °C). Her blood pressure is 103/70 and her pulse is 78. Her respiration is 18 and oxygen saturation is 100%.     Reason for PRN Administration: Patient screaming and yelling and telling the staff that she owns all of this.  She was screaming at RT person and telling her that she is fired and she doesn't have to come back tomorrow.  She then started cursing at the wall.  She was paranoid, grandiose and delusional.  She became un-redirectable    Intervention:    Administered Benadrl 50 mg, Haldol 20 mg and Ativan 2 mg IM per physician order to Gilmer       Response:    Gilmer tolerated administration well.      Plan:     Continue to monitor per MD/PA/APRN orders; and reevaluate medication effectiveness within 30 minutes.   "

## 2023-09-06 NOTE — NURSING
"Daily Nursing Note:      Behavior:    Patient (Gilmer Carmona is a 23 y.o. female, : 2000, MRN: [id number]) demonstrating an affect that was flat. Gilmer demonstrating mood that is anxious. Gilmer had an appearance that was clean. Gilmer denies suicidal ideation. Gilmer denies suicide plan. Gilmer denies homicidal ideation. Gilmer denies hallucinations. Patient isolates to her room and does not interact with staff or peers.  Appears irritable when asking questions.    Gilmer's  height is 5' 6" (1.676 m) and weight is 89.8 kg (198 lb). Her temperature is 97.9 °F (36.6 °C). Her blood pressure is 106/73 and her pulse is 78. Her respiration is 18 and oxygen saturation is 98%.           Intervention:    Encourage Gilmer to perform self-hygiene, grooming, and changing of clothing. Monitor Gilmer's behavior and program compliance. Monitor Gilmer for suicidal ideation, homicidal ideation, sleep disturbance, and hallucinations. Encourage Gilmer to eat all portions of meals and assess for meal preferences. Monitor Gilmer for intake and output to ensure hydration. Notify the Physician/Physician Assistant/Advance Practice Registered Nurse (MD/PA/APRN) for any medication refusal and any change in patient condition.      Response:    Gilmer verbalizes understand of unit process and procedures. Gilmer reported medications She has not taken any yet.      Plan:     Continue to monitor per MD/PA/APRN orders; maintain patient safety.   "

## 2023-09-06 NOTE — CARE UPDATE
Treatment Team    Pt seem for treatment team today with interdisciplinary team.  Pt is Cooperative and Anxious with Tx team. Pt denies symptoms at this time. MD did not change pt meds at this time. Treatment teams goals Not met at this time. Pt DC plan is unknown at this time. DC date scheduled for 9.11.2023.

## 2023-09-06 NOTE — PROGRESS NOTES
"Harris is a 23 female admitted for Unspecified Psychotic Disorder and Cannabis use disorder, with a uds +cannabis. CTRS met with Pt 1:1, Harris was superficial but cooperative requesting a cigarette and to go outside, becoming evasive and began responding to internal stimuli AEB talking to herself, becoming verbal aggressive, hostile, and threatening CTRS, requiring security to attend interview. Harris reported her treatment goal as "Self-esteem".     09/05/23 0840   General   Admit Date 09/04/23   Primary Diagnosis Unspecified Psychotic Disorder   Secondary Diagnosis Cannabis use disorder   [employer]   Number of Children 2   Children Living? 2   Occupation unemployed   Does the patient have dentures? No   If you were to take part in activities, which of the following would you prefer? Activities that you do alone   Do you feel like you have enough to keep you busy now? Yes   Do you believe that you have the opportunity for physical activity? Yes   Activity Capabilities Minimum   Subjective   Patient states I was dehydrated   Assessment   Mobility ambulates independently   Transfers independently   Visual Acuity normal vision   Visual Perception depth perception;color perception;recognizes letters;recognizes numbers   Hearing normal   Speech/Communication normal   Cognitive Concerns oriented x4   Emotional Concerns appears agitated;appears isolated;excessive emotional response;anger   Leisure Interest Survey   Leisure Interest Survey Yes   Solitary Activities   Music Listening Current Interest   Reading Current Interest   Physical Activities   Dancing Current Interest   Creative Activities   Creative Writing Current Interest   Singing Current Interest   Goals   Additional Documentation yes   Goal Formulation With patient   Time For Goal Achievement 7 days   Goal 1 self-esteem   Plan   Planned Therapy Intervention Group Recreational Therapy   Expected Length of Stay 5-7days   PT Frequency Minimum of 3 visits " per week

## 2023-09-07 PROCEDURE — S4991 NICOTINE PATCH NONLEGEND: HCPCS | Performed by: PSYCHIATRY & NEUROLOGY

## 2023-09-07 PROCEDURE — 25000003 PHARM REV CODE 250: Performed by: PSYCHIATRY & NEUROLOGY

## 2023-09-07 PROCEDURE — 25000003 PHARM REV CODE 250

## 2023-09-07 PROCEDURE — 11400000 HC PSYCH PRIVATE ROOM

## 2023-09-07 RX ADMIN — NICOTINE 1 PATCH: 21 PATCH, EXTENDED RELEASE TRANSDERMAL at 09:09

## 2023-09-07 RX ADMIN — HYDROXYZINE HYDROCHLORIDE 50 MG: 50 TABLET, FILM COATED ORAL at 08:09

## 2023-09-07 RX ADMIN — OLANZAPINE 2.5 MG: 2.5 TABLET, FILM COATED ORAL at 08:09

## 2023-09-07 RX ADMIN — HYDROXYZINE HYDROCHLORIDE 50 MG: 50 TABLET, FILM COATED ORAL at 09:09

## 2023-09-07 RX ADMIN — TRAZODONE HYDROCHLORIDE 100 MG: 100 TABLET ORAL at 10:09

## 2023-09-07 NOTE — PROGRESS NOTES
"9/7/2023  Gilmer Carmona   2000   [id number]        Psychiatry Progress Note     Chief Complaint: "All right"    SUBJECTIVE:   Gilmer Carmona is a 23 y.o. female  placed under a PEC at [facility] after being found sleeping under a tree and responding to internal stimuli in the ED.    Today patient states that she is feeling "fine". She has not taken the Zyprexa because it is not the Vraylar she was prescribed outpatient. Vraylar is not on formulary or available to us at [facility]. I educated her on the Zyprexa and she agreed to use this medication until she is discharged and able to follow up with her provider to be placed back on Vraylar.Staff reported that she continues to respond to internal stimuli. Yesterday staff attempted an interview with her and she became evasive and began responding to internal stimuli AEB talking to herself, becoming verbal aggressive, hostile, and threatening CTRS, requiring security to attend interview. She continues to minimize her symptoms and the event that led to her admission, however she does not show signs of psychosis or cynthia at this time. She is calm and cooperative during this exam. Will f/u with progress and will augment care as needed.    UDS: (+)cannabis  Blood alcohol: <10     Current Medications:   Scheduled Meds:    nicotine  1 patch Transdermal Daily    OLANZapine  2.5 mg Oral QHS      PRN Meds: acetaminophen, aluminum-magnesium hydroxide-simethicone, haloperidoL **AND** diphenhydrAMINE **AND** LORazepam **AND** haloperidol lactate **AND** diphenhydrAMINE **AND** lorazepam, hydrOXYzine HCL, traZODone   Psychotherapeutics (From admission, onward)      Start     Stop Route Frequency Ordered    09/05/23 2100  OLANZapine tablet 2.5 mg         -- Oral Nightly 09/05/23 1012    09/04/23 2102  haloperidoL tablet 10 mg  (Med - Acute  Behavioral Management)        See Hyperspace for full Linked Orders Report.    -- Oral Every 4 hours PRN 09/04/23 2102    09/04/23 2102  LORazepam tablet 2 mg "  (Med - Acute  Behavioral Management)        See Hyperspace for full Linked Orders Report.    -- Oral Every 4 hours PRN 09/04/23 2102    09/04/23 2102  haloperidol lactate injection 10 mg  (Med - Acute  Behavioral Management)        See Hyperspace for full Linked Orders Report.    -- IM Every 4 hours PRN 09/04/23 2102    09/04/23 2102  LORazepam injection 2 mg  (Med - Acute  Behavioral Management)        See Hyperspace for full Linked Orders Report.    -- IM Every 4 hours PRN 09/04/23 2102    09/04/23 2102  traZODone tablet 100 mg         -- Oral Nightly PRN 09/04/23 2102            Allergies:   Review of patient's allergies indicates:  No Known Allergies     OBJECTIVE:   Vitals   Vitals:    09/07/23 0701   BP: 120/82   Pulse: 65   Resp: 16   Temp: 98 °F (36.7 °C)        Labs/Imaging/Studies:   Recent Results (from the past 36 hour(s))   Hemoglobin A1C    Collection Time: 09/06/23  6:18 AM   Result Value Ref Range    Hemoglobin A1c 5.0 <=7.0 %    Estimated Average Glucose 96.8 mg/dL   TSH    Collection Time: 09/06/23  6:18 AM   Result Value Ref Range    Thyroid Stimulating Hormone 0.519 0.350 - 4.940 uIU/mL   SYPHILIS ANTIBODY (WITH REFLEX RPR)    Collection Time: 09/06/23  6:18 AM   Result Value Ref Range    Syphilis Antibody Nonreactive Nonreactive, Equivocal   Lipid Panel    Collection Time: 09/06/23  6:18 AM   Result Value Ref Range    Cholesterol Total 96 <=200 mg/dL    HDL Cholesterol 33 (L) 35 - 60 mg/dL    Triglyceride 41 37 - 140 mg/dL    Cholesterol/HDL Ratio 3 0 - 5    Very Low Density Lipoprotein 8     LDL Cholesterol 55.00 50.00 - 140.00 mg/dL   Comprehensive Metabolic Panel    Collection Time: 09/06/23  6:18 AM   Result Value Ref Range    Sodium Level 140 136 - 145 mmol/L    Potassium Level 4.2 3.5 - 5.1 mmol/L    Chloride 104 98 - 107 mmol/L    Carbon Dioxide 29 22 - 29 mmol/L    Glucose Level 97 74 - 100 mg/dL    Blood Urea Nitrogen 12.4 7.0 - 18.7 mg/dL    Creatinine 0.83 0.55 - 1.02 mg/dL     "Calcium Level Total 8.8 8.4 - 10.2 mg/dL    Protein Total 6.7 6.4 - 8.3 gm/dL    Albumin Level 3.6 3.5 - 5.0 g/dL    Globulin 3.1 2.4 - 3.5 gm/dL    Albumin/Globulin Ratio 1.2 1.1 - 2.0 ratio    Bilirubin Total 0.4 <=1.5 mg/dL    Alkaline Phosphatase 52 40 - 150 unit/L    Alanine Aminotransferase 13 0 - 55 unit/L    Aspartate Aminotransferase 17 5 - 34 unit/L    eGFR >60 mls/min/1.73/m2          Medical Review Of Systems:  Constitutional: negative  Respiratory: negative  Cardiovascular: negative  Gastrointestinal: negative  Genitourinary:negative  Musculoskeletal:negative  Neurological: negative      Psychiatric Mental Status Exam:  General Appearance: appears stated age, well-developed, well-nourished  Arousal: alert  Behavior: somewhat guarded  Movements and Motor Activity: no abnormal involuntary movements noted  Orientation: oriented to person, place, time, and situation  Speech: normal rhythm, normal tone, soft-spoken, slowed  Mood: "Ok"  Affect: blunted  Thought Process: grossly linear  Associations: intact  Thought Content and Perceptions: recent responding to internal stimuli, no acute suicidal ideation, no homicidal ideation  Recent and Remote Memory: fair; per interview/observation with patient  Attention and Concentration: intact, attentive to conversation; per interview/observation with patient  Fund of Knowledge: fair, aware of current events, vocabulary appropriate; based on history, vocabulary, fund of knowledge, syntax, grammar, and content  Insight: questionable; based on understanding of severity of illness and HPI  Judgment: questionable; based on patient's behavior and HPI      ASSESSMENT/PLAN:   Problems Addressed/Diagnoses:  Unspecified Psychotic Disorder (F29)  Cannabis use disorder (F12.20)    No past medical history on file.     Plan:  Psychosis  -Continue Zyprexa (has not yet taken)    Cannabis use  -Group/Individual psychotherapy      Expected Disposition Plan: Home        Liam Peoples, " PMHNP-BC

## 2023-09-07 NOTE — PLAN OF CARE
Problem: Adult Inpatient Plan of Care  Goal: Plan of Care Review  Outcome: Met  Goal: Patient-Specific Goal (Individualized)  Outcome: Met  Goal: Absence of Hospital-Acquired Illness or Injury  Outcome: Met  Goal: Optimal Comfort and Wellbeing  Outcome: Met  Goal: Readiness for Transition of Care  Outcome: Met     Problem: Behavior Regulation Impairment (Psychotic Signs/Symptoms)  Goal: Improved Behavioral Control (Psychotic Signs/Symptoms)  Outcome: Met     Problem: Cognitive Impairment (Psychotic Signs/Symptoms)  Goal: Optimal Cognitive Function (Psychotic Signs/Symptoms)  Outcome: Ongoing, Progressing     Problem: Decreased Participation and Engagement (Psychotic Signs/Symptoms)  Goal: Increased Participation and Engagement (Psychotic Signs/Symptoms)  Outcome: Ongoing, Progressing     Problem: Mood Impairment (Psychotic Signs/Symptoms)  Goal: Improved Mood Symptoms (Psychotic Signs/Symptoms)  Outcome: Ongoing, Progressing     Problem: Psychomotor Impairment (Psychotic Signs/Symptoms)  Goal: Improved Psychomotor Symptoms (Psychotic Signs/Symptoms)  Outcome: Ongoing, Progressing     Problem: Sensory Perception Impairment (Psychotic Signs/Symptoms)  Goal: Decreased Sensory Symptoms (Psychotic Signs/Symptoms)  Outcome: Ongoing, Progressing     Problem: Sleep Disturbance (Psychotic Signs/Symptoms)  Goal: Improved Sleep (Psychotic Signs/Symptoms)  Outcome: Ongoing, Progressing     Problem: Social, Occupational or Functional Impairment (Psychotic Signs/Symptoms)  Goal: Enhanced Social, Occupational or Functional Skills (Psychotic Signs/Symptoms)  Outcome: Ongoing, Progressing     Problem: Violence Risk or Actual  Goal: Anger and Impulse Control  Outcome: Ongoing, Progressing
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
## 2023-09-07 NOTE — NURSING
"Daily Nursing Note:      Behavior:    Patient (Gilmer Carmona is a 23 y.o. female, : 2000, MRN: [id number]) demonstrating an affect that was flat and anxious. Gilmer demonstrating mood that is anxious. Gilmer had an appearance that was poor hygiene. Gilmer denies suicidal ideation. Gilmer denies suicide plan. Gilmer denies homicidal ideation. Gilmer endorses hallucinations.    Gilmer's  height is 5' 6" (1.676 m) and weight is 89.8 kg (198 lb). Her temperature is 98.2 °F (36.8 °C). Her blood pressure is 103/70 and her pulse is 78. Her respiration is 18 and oxygen saturation is 100%.     Kristians last BM was noted on: _______      Intervention:    Encourage Gilmer to perform self-hygiene, grooming, and changing of clothing. Monitor Gilmer's behavior and program compliance. Monitor Gilmer for suicidal ideation, homicidal ideation, sleep disturbance, and hallucinations. Encourage Gilmer to eat all portions of meals and assess for meal preferences. Monitor Gilmer for intake and output to ensure hydration. Notify the Physician/Physician Assistant/Advance Practice Registered Nurse (MD/PA/APRN) for any medication refusal and any change in patient condition.      Response:    Gilmer verbalizes understand of unit process and procedures. Gilmer reported medications ______.      Plan:     Continue to monitor per MD/PA/APRN orders; maintain patient safety.   "

## 2023-09-07 NOTE — NURSING
"PRN Medication Follow-up Note:    Behavior:    Patient (Gilmer Carmona is a 23 y.o. female, : 2000, MRN: [id number])     Allergies: Patient has no known allergies.    Kristians  height is 5' 6" (1.676 m) and weight is 89.8 kg (198 lb). Her temperature is 98 °F (36.7 °C). Her blood pressure is 120/82 and her pulse is 65. Her respiration is 16 and oxygen saturation is 100%.     Administered Atarax 50 mg orally per physician order to Gilmer       Intervention:    Intervention to Gilmer's response: no  further complaints of anxiety.       Response:    Gilmer's response:  Medication effective      Plan:     Continue to monitor per MD/PA/APRN orders; and reevaluate medication effectiveness within 30 minutes.   "

## 2023-09-07 NOTE — NURSING
"PRN Administration Note:    Behavior:    Patient (Gilmer Carmona is a 23 y.o. female, : 2000, MRN: [id number])     Allergies: Patient has no known allergies.    Gilmer's  height is 5' 6" (1.676 m) and weight is 89.8 kg (198 lb). Her temperature is 98 °F (36.7 °C). Her blood pressure is 120/82 and her pulse is 65. Her respiration is 16 and oxygen saturation is 100%.     Reason for PRN Administration: Anxiety 10/10.    Intervention:    Administered Atarax 50 mg orally per physician order to Gilmer       Response:    Gilmer tolerated administration well.      Plan:     Continue to monitor per MD/PA/APRN orders; and reevaluate medication effectiveness within 30 minutes.   "

## 2023-09-08 PROCEDURE — 25000003 PHARM REV CODE 250

## 2023-09-08 PROCEDURE — 25000003 PHARM REV CODE 250: Performed by: PSYCHIATRY & NEUROLOGY

## 2023-09-08 PROCEDURE — 11400000 HC PSYCH PRIVATE ROOM

## 2023-09-08 PROCEDURE — 25000003 PHARM REV CODE 250: Performed by: PEDIATRICS

## 2023-09-08 PROCEDURE — S4991 NICOTINE PATCH NONLEGEND: HCPCS | Performed by: PSYCHIATRY & NEUROLOGY

## 2023-09-08 RX ORDER — HYDROXYZINE HYDROCHLORIDE 50 MG/1
50 TABLET, FILM COATED ORAL
Status: DISCONTINUED | OUTPATIENT
Start: 2023-09-08 | End: 2023-09-13 | Stop reason: HOSPADM

## 2023-09-08 RX ORDER — ADHESIVE BANDAGE
30 BANDAGE TOPICAL DAILY PRN
Status: DISCONTINUED | OUTPATIENT
Start: 2023-09-08 | End: 2023-09-13 | Stop reason: HOSPADM

## 2023-09-08 RX ADMIN — HYDROXYZINE HYDROCHLORIDE 50 MG: 50 TABLET, FILM COATED ORAL at 05:09

## 2023-09-08 RX ADMIN — HYDROXYZINE HYDROCHLORIDE 50 MG: 50 TABLET, FILM COATED ORAL at 09:09

## 2023-09-08 RX ADMIN — NICOTINE 1 PATCH: 21 PATCH, EXTENDED RELEASE TRANSDERMAL at 09:09

## 2023-09-08 RX ADMIN — HYDROXYZINE HYDROCHLORIDE 50 MG: 50 TABLET, FILM COATED ORAL at 01:09

## 2023-09-08 RX ADMIN — ALUMINUM HYDROXIDE, MAGNESIUM HYDROXIDE, AND SIMETHICONE 30 ML: 200; 200; 20 SUSPENSION ORAL at 06:09

## 2023-09-08 RX ADMIN — HYDROXYZINE HYDROCHLORIDE 50 MG: 50 TABLET, FILM COATED ORAL at 10:09

## 2023-09-08 RX ADMIN — OLANZAPINE 2.5 MG: 2.5 TABLET, FILM COATED ORAL at 08:09

## 2023-09-08 RX ADMIN — MAGNESIUM HYDROXIDE 2400 MG: 400 SUSPENSION ORAL at 01:09

## 2023-09-08 NOTE — NURSING
"PRN Medication Follow-up Note:    Behavior:    Patient (Gilmer Carmona is a 23 y.o. female, : 2000, MRN: [id number])     Allergies: Patient has no known allergies.    Kristians  height is 5' 6" (1.676 m) and weight is 89.8 kg (198 lb). Her temperature is 98.7 °F (37.1 °C). Her blood pressure is 115/78 and her pulse is 67. Her respiration is 18 and oxygen saturation is 100%.     Administered Atarax 50 mg PO PRN per physician order to Gilmer       Intervention:    Intervention to Gilmer's response: decrease in anxiety.      Response:    Gilmer's response: decrease in anxiety.      Plan:     Continue to monitor per MD/PA/APRN orders; and reevaluate medication effectiveness within 30 minutes.    "

## 2023-09-08 NOTE — PLAN OF CARE
Problem: Cognitive Impairment (Psychotic Signs/Symptoms)  Goal: Optimal Cognitive Function (Psychotic Signs/Symptoms)  Outcome: Ongoing, Progressing     Problem: Decreased Participation and Engagement (Psychotic Signs/Symptoms)  Goal: Increased Participation and Engagement (Psychotic Signs/Symptoms)  Outcome: Ongoing, Progressing     Problem: Mood Impairment (Psychotic Signs/Symptoms)  Goal: Improved Mood Symptoms (Psychotic Signs/Symptoms)  Outcome: Ongoing, Progressing     Problem: Psychomotor Impairment (Psychotic Signs/Symptoms)  Goal: Improved Psychomotor Symptoms (Psychotic Signs/Symptoms)  Outcome: Ongoing, Progressing     Problem: Sensory Perception Impairment (Psychotic Signs/Symptoms)  Goal: Decreased Sensory Symptoms (Psychotic Signs/Symptoms)  Outcome: Ongoing, Progressing     Problem: Sleep Disturbance (Psychotic Signs/Symptoms)  Goal: Improved Sleep (Psychotic Signs/Symptoms)  Outcome: Ongoing, Progressing     Problem: Social, Occupational or Functional Impairment (Psychotic Signs/Symptoms)  Goal: Enhanced Social, Occupational or Functional Skills (Psychotic Signs/Symptoms)  Outcome: Ongoing, Progressing     Problem: Violence Risk or Actual  Goal: Anger and Impulse Control  Outcome: Ongoing, Progressing     Problem: Activity and Energy Impairment (Excessive Substance Use)  Goal: Optimized Energy Level (Excessive Substance Use)  Outcome: Ongoing, Progressing     Problem: Behavior Regulation Impairment (Excessive Substance Use)  Goal: Improved Behavioral Control (Excessive Substance Use)  Outcome: Ongoing, Progressing     Problem: Decreased Participation and Engagement (Excessive Substance Use)  Goal: Increased Participation and Engagement (Excessive Substance Use)  Outcome: Ongoing, Progressing     Problem: Physiologic Impairment (Excessive Substance Use)  Goal: Improved Physiologic Symptoms (Excessive Substance Use)  Outcome: Ongoing, Progressing     Problem: Social, Occupational or Functional  Impairment (Excessive Substance Use)  Goal: Enhanced Social, Occupational or Functional Skills (Excessive Substance Use)  Outcome: Ongoing, Progressing

## 2023-09-08 NOTE — NURSING
"PRN Medication Follow-up Note:    Behavior:    Patient (Gilmer Carmona is a 23 y.o. female, : 2000, MRN: [id number])     Allergies: Patient has no known allergies.    Gilmer's  height is 5' 6" (1.676 m) and weight is 89.8 kg (198 lb). Her temperature is 98 °F (36.7 °C). Her blood pressure is 120/82 and her pulse is 65. Her respiration is 16 and oxygen saturation is 100%.     Administered _trazodone______ per physician order to Gilmer       Intervention:    Intervention to Gilmer's response: _none needed________.       Response:    Gilmer's response: _no further c/o voiced________      Plan:     Continue to monitor per MD/PA/APRN orders; and reevaluate medication effectiveness within 30 minutes.   "

## 2023-09-08 NOTE — NURSING
"PRN Administration Note:    Behavior:    Patient (Gilmer Carmona is a 23 y.o. female, : 2000, MRN: [id number])     Allergies: Patient has no known allergies.    Gilmer's  height is 5' 6" (1.676 m) and weight is 89.8 kg (198 lb). Her temperature is 98.7 °F (37.1 °C). Her blood pressure is 115/78 and her pulse is 67. Her respiration is 18 and oxygen saturation is 100%.     Reason for PRN Administration: anxiety.    Intervention:    Administered Atarax 50 mg PO PRN per physician order to Gilmer       Response:    Gilmer tolerated administration well.      Plan:     Continue to monitor per MD/PA/APRN orders; and reevaluate medication effectiveness within 30 minutes.    "

## 2023-09-08 NOTE — PLAN OF CARE
Behavioral Health Unit  Psychosocial History and Assessment  Progress Note      Patient Name: Gilmer Carmona YOB: 2000 SW: Kelli Vogel Date: 9/8/2023    Chief Complaint: depression, mood swings, and psychosis    Consent:     Did the patient consent for an interview with the ? No    Did the patient consent for the  to contact family/friend/caregiver?   Yes  Name: Willian Carmona, Relationship: mother, and Contact: [phone]    Did the patient give consent for the  to inform family/friend/caregiver of his/her whereabouts or to discuss discharge planning? Yes    Source of Information: Face to face with patient and Telephone interview with family/friend/caregiver    Is information obtained from interviews considered reliable?   yes    Reason for Admission:     Active Hospital Problems    Diagnosis  POA    *Psychotic disorder [F29]  Unknown    Cannabis dependence, continuous [F12.20]  Unknown      Resolved Hospital Problems   No resolved problems to display.       History of Present Illness - (Patient Perception):   Patient is unable to participate in giving data pertinent to treatment and discharge    Present biopsychosocial functioning: moderate symptoms    Past biopsychosocial functioning: Pt has history of higher functioning    Family and Marital/Relationship History:     Significant Other/Partner Relationships:  Partnered:      Family Relationships: Intact and Strained      Childhood History:     Where was patient raised? [place]    Who raised the patient? Mom      How does patient describe their childhood? Indifferent      Who is patient's primary support person? sisters      Culture and Alevism:     Alevism: No Alevism    How strong of a role does Buddhism and spirituality play in patient's life? none    Methodist or spiritual concerns regarding treatment: not applicable     History of Abuse:   History of Abuse: Denies      Outcome:  denies    Psychiatric and Medical History:     History of psychiatric illness or treatment: prior inpatient treatment, psychotropic management by PCP, and has participated in counseling/psychotherapy on an outpatient basis in the past    Medical history: No past medical history on file.    Substance Abuse History:     Alcohol - (Patient Perspective): denies drinking alcohol  Social History     Substance and Sexual Activity   Alcohol Use Not on file         Drugs - (Patient Perspective): pt states that every now and then she smokes marijuana  Social History     Substance and Sexual Activity   Drug Use Not on file         Education:     Currently Enrolled? No  Attended College/Technical School    Special Education? No    Interested in Completing Education/GED: No    Employment and Financial:     Currently employed? Unemployed     Source of Income:  none at this time    Able to afford basic needs (food, shelter, utilities)? No    Who manages finances/personal affairs? self      Service:     ? no    Combat Service? No     Community Resources:     Describe present use of community resources: none at this time     Identify previously used community resources   (Include previous mental health treatment - outpatient and inpatient): outpatient mental health    Environmental:     Current living situation:Lives with family    Social Evaluation:     Patient Assets: General fund of knowledge, Supportive family/friends, Physical health, and Communicable skills    Patient Limitations: homeless    High risk psychosocial issues that may impact discharge planning:   homeless and from [place], [place]    Recommendations:     Anticipated discharge plan:   Plans to go back to [place]    High risk issues requiring early treatment planning and immediate intervention: homeless    Community resources needed for discharge planning:  housing, aftercare treatment sources, and  transportation    Anticipated social work role(s) in treatment and discharge planning: advice and , group therapy, individual as needed, and aftercare treatment   09/08/23 4107   Initial Information   Source of Information patient   Reason for Admission psychosis   Patient Aware of Diagnosis no   Arrived From emergency department   Substance Use/Withdrawal   Substance Use Denies use   Additional Tobacco Use   How many cigarettes do you typically have per day? 3   AUDIT-C (Alcohol Use Disorders ID Test)   Alcohol Use In Past Year 0-->never   Alcohol Amount Per Day In Past Year 0-->none   More Than 6 Drinks On One Occasion In Past Year 0-->never   Total Audit C Score 0

## 2023-09-08 NOTE — NURSING
"PRN Administration Note:    Behavior:    Patient (Gilmer Carmona is a 23 y.o. female, : 2000, MRN: [id number])     Allergies: Patient has no known allergies.    Gilmer's  height is 5' 6" (1.676 m) and weight is 89.8 kg (198 lb). Her temperature is 98.7 °F (37.1 °C). Her blood pressure is 115/78 and her pulse is 67. Her respiration is 18 and oxygen saturation is 100%.     Reason for PRN Administration: anxiety.  Intervention:    Administered Atarax 50 mg PO PRN per physician order to Gilmer       Response:    Gilmer tolerated administration well.      Plan:     Continue to monitor per MD/PA/APRN orders; and reevaluate medication effectiveness within 30 minutes.    "

## 2023-09-08 NOTE — NURSING
"Daily Nursing Note:      Behavior:    Patient (Gilmer Carmona is a 23 y.o. female, : 2000, MRN: [id number]) demonstrating an affect that was sad, flat, anxious, irritable, agitated, angry, and  labile. Gilmer demonstrating mood that is depressed, angry, anxious, and swings. Gilmer had an appearance that was disheveled. Gilmer denies suicidal ideation. Gilmer denies suicide plan. Gilmer denies homicidal ideation. Gilmer endorses hallucinations.    Gilmer's  height is 5' 6" (1.676 m) and weight is 89.8 kg (198 lb). Her temperature is 98.7 °F (37.1 °C). Her blood pressure is 115/78 and her pulse is 67. Her respiration is 18 and oxygen saturation is 100%.     Kristians last BM was noted on: 23.      Intervention:    Encourage Gilmer to perform self-hygiene, grooming, and changing of clothing. Monitor Gilmer's behavior and program compliance. Monitor Gilmer for suicidal ideation, homicidal ideation, sleep disturbance, and hallucinations. Encourage Gilmer to eat all portions of meals and assess for meal preferences. Monitor Gilmer for intake and output to ensure hydration. Notify the Physician/Physician Assistant/Advance Practice Registered Nurse (MD/PA/APRN) for any medication refusal and any change in patient condition.      Response:    Gilmer verbalizes understand of unit process and procedures.       Plan:     Continue to monitor per MD/PA/APRN orders; maintain patient safety.    "

## 2023-09-08 NOTE — NURSING
"PRN Administration Note:    Behavior:    Patient (Gilmer Carmona is a 23 y.o. female, : 2000, MRN: [id number])     Allergies: Patient has no known allergies.    Gilmer's  height is 5' 6" (1.676 m) and weight is 89.8 kg (198 lb). Her temperature is 98 °F (36.7 °C). Her blood pressure is 120/82 and her pulse is 65. Her respiration is 16 and oxygen saturation is 100%.     Reason for PRN Administration: __anixety________.    Intervention:    Administered _atarax______ per physician order to Gilmer       Response:    Gilmer tolerated administration well.      Plan:     Continue to monitor per MD/PA/APRN orders; and reevaluate medication effectiveness within 30 minutes.   "

## 2023-09-08 NOTE — NURSING
"PRN Medication Follow-up Note:    Behavior:    Patient (Gilmer Carmona is a 23 y.o. female, : 2000, MRN: [id number])     Allergies: Patient has no known allergies.    Gilmer's  height is 5' 6" (1.676 m) and weight is 89.8 kg (198 lb). Her temperature is 98 °F (36.7 °C). Her blood pressure is 120/82 and her pulse is 65. Her respiration is 16 and oxygen saturation is 100%.     Administered atarax_______ per physician order to Gilmer       Intervention:    Intervention to Gilmer's response: _none needed________.       Response:    Gilmer's response: _no furthr c/o voiced________      Plan:     Continue to monitor per MD/PA/APRN orders; and reevaluate medication effectiveness within 30 minutes.   "

## 2023-09-08 NOTE — PLAN OF CARE
Problem: Cognitive Impairment (Psychotic Signs/Symptoms)  Goal: Optimal Cognitive Function (Psychotic Signs/Symptoms)  Outcome: Ongoing, Progressing     Problem: Decreased Participation and Engagement (Psychotic Signs/Symptoms)  Goal: Increased Participation and Engagement (Psychotic Signs/Symptoms)  Outcome: Ongoing, Progressing     Problem: Mood Impairment (Psychotic Signs/Symptoms)  Goal: Improved Mood Symptoms (Psychotic Signs/Symptoms)  Outcome: Ongoing, Progressing     Problem: Psychomotor Impairment (Psychotic Signs/Symptoms)  Goal: Improved Psychomotor Symptoms (Psychotic Signs/Symptoms)  Outcome: Ongoing, Progressing     Problem: Sensory Perception Impairment (Psychotic Signs/Symptoms)  Goal: Decreased Sensory Symptoms (Psychotic Signs/Symptoms)  Outcome: Ongoing, Progressing     Problem: Sleep Disturbance (Psychotic Signs/Symptoms)  Goal: Improved Sleep (Psychotic Signs/Symptoms)  Outcome: Ongoing, Progressing     Problem: Social, Occupational or Functional Impairment (Psychotic Signs/Symptoms)  Goal: Enhanced Social, Occupational or Functional Skills (Psychotic Signs/Symptoms)  Outcome: Ongoing, Progressing     Problem: Violence Risk or Actual  Goal: Anger and Impulse Control  Outcome: Ongoing, Progressing

## 2023-09-08 NOTE — PROGRESS NOTES
"9/8/2023  Gilmer Carmona   2000   [id number]        Psychiatry Progress Note     Chief Complaint: "All right"    SUBJECTIVE:   Gilmer Carmona is a 23 y.o. female  placed under a PEC at [facility] after being found sleeping under a tree and responding to internal stimuli in the ED.    Today patient states that she is feeling "good". She did begin taken the Zyprexa and states that she is tolerating this well. Staff reported that she continues to respond to internal stimuli. Staff did speak with family and put in place a plan to be discharged back to [facility] in [place]. Her mother states that she is currently attempting to obtain POA. Her mood today is calm and she appears to be improving. Staff report that she continues to respond to internal stimuli. She is calm and cooperative during this exam. She does indicate trouble sleeping. Will f/u with progress and will augment care as needed.    UDS: (+)cannabis  Blood alcohol: <10     Current Medications:   Scheduled Meds:    nicotine  1 patch Transdermal Daily    OLANZapine  2.5 mg Oral QHS      PRN Meds: acetaminophen, aluminum-magnesium hydroxide-simethicone, haloperidoL **AND** diphenhydrAMINE **AND** LORazepam **AND** haloperidol lactate **AND** diphenhydrAMINE **AND** lorazepam, hydrOXYzine HCL, traZODone   Psychotherapeutics (From admission, onward)      Start     Stop Route Frequency Ordered    09/05/23 2100  OLANZapine tablet 2.5 mg         -- Oral Nightly 09/05/23 1012    09/04/23 2102  haloperidoL tablet 10 mg  (Med - Acute  Behavioral Management)        See Hyperspace for full Linked Orders Report.    -- Oral Every 4 hours PRN 09/04/23 2102    09/04/23 2102  LORazepam tablet 2 mg  (Med - Acute  Behavioral Management)        See Hyperspace for full Linked Orders Report.    -- Oral Every 4 hours PRN 09/04/23 2102    09/04/23 2102  haloperidol lactate injection 10 mg  (Med - Acute  Behavioral Management)        See Hyperspace for full Linked Orders Report.    -- " "IM Every 4 hours PRN 09/04/23 2102 09/04/23 2102  LORazepam injection 2 mg  (Med - Acute  Behavioral Management)        See [place] for full Linked Orders Report.    -- IM Every 4 hours PRN 09/04/23 2102 09/04/23 2102  traZODone tablet 100 mg         -- Oral Nightly PRN 09/04/23 2102            Allergies:   Review of patient's allergies indicates:  No Known Allergies     OBJECTIVE:   Vitals   Vitals:    09/08/23 0701   BP: 115/78   Pulse: 67   Resp: 18   Temp: 98.7 °F (37.1 °C)        Labs/Imaging/Studies:   No results found for this or any previous visit (from the past 36 hour(s)).         Medical Review Of Systems:  Constitutional: negative  Respiratory: negative  Cardiovascular: negative  Gastrointestinal: negative  Genitourinary:negative  Musculoskeletal:negative  Neurological: negative      Psychiatric Mental Status Exam:  General Appearance: appears stated age, well-developed, well-nourished  Arousal: alert  Behavior: somewhat guarded  Movements and Motor Activity: no abnormal involuntary movements noted  Orientation: oriented to person, place, time, and situation  Speech: normal rhythm, normal tone, soft-spoken, slowed  Mood: "Ok"  Affect: blunted  Thought Process: grossly linear  Associations: intact  Thought Content and Perceptions: recent responding to internal stimuli, no acute suicidal ideation, no homicidal ideation  Recent and Remote Memory: fair; per interview/observation with patient  Attention and Concentration: intact, attentive to conversation; per interview/observation with patient  Fund of Knowledge: fair, aware of current events, vocabulary appropriate; based on history, vocabulary, fund of knowledge, syntax, grammar, and content  Insight: questionable; based on understanding of severity of illness and HPI  Judgment: questionable; based on patient's behavior and HPI      ASSESSMENT/PLAN:   Problems Addressed/Diagnoses:  Unspecified Psychotic Disorder (F29)  Cannabis use disorder " (F12.20)    No past medical history on file.     Plan:  Psychosis  -Continue Zyprexa (has not yet taken)    Cannabis use  -Group/Individual psychotherapy    Insomnia  -Increase Trazodone to 150 mg HS PRN    Expected Disposition Plan: Home        Liam Peoples, WILMERP-BC

## 2023-09-08 NOTE — NURSING
"Daily Nursing Note:      Behavior:    Patient (Gilmer Carmona is a 23 y.o. female, : 2000, MRN: [id number]) demonstrating an affect that was flat. Gilmer demonstrating mood that is anxious. Gilmer had an appearance that was poor hygiene. Gilmer denies suicidal ideation. Gilmer denies suicide plan. Gilmer denies homicidal ideation. Gilmer endorses hallucinations.    Gilmer's  height is 5' 6" (1.676 m) and weight is 89.8 kg (198 lb). Her temperature is 98 °F (36.7 °C). Her blood pressure is 120/82 and her pulse is 65. Her respiration is 16 and oxygen saturation is 100%.     Kristians last BM was noted on: _______      Intervention:    Encourage Gilmer to perform self-hygiene, grooming, and changing of clothing. Monitor Gilmer's behavior and program compliance. Monitor Gilmer for suicidal ideation, homicidal ideation, sleep disturbance, and hallucinations. Encourage Gilmer to eat all portions of meals and assess for meal preferences. Monitor Gilmer for intake and output to ensure hydration. Notify the Physician/Physician Assistant/Advance Practice Registered Nurse (MD/PA/APRN) for any medication refusal and any change in patient condition.      Response:    Gilmer verbalizes understand of unit process and procedures. Gilmer reported medications ______.      Plan:     Continue to monitor per MD/PA/APRN orders; maintain patient safety.   "

## 2023-09-08 NOTE — NURSING
"PRN Administration Note:    Behavior:    Patient (Gilmer Carmona is a 23 y.o. female, : 2000, MRN: [id number])     Allergies: Patient has no known allergies.    Gilmer's  height is 5' 6" (1.676 m) and weight is 89.8 kg (198 lb). Her temperature is 98 °F (36.7 °C). Her blood pressure is 120/82 and her pulse is 65. Her respiration is 16 and oxygen saturation is 100%.     Reason for PRN Administration: _sleep_________.    Intervention:    Administered _trazodone______ per physician order to Gilmer       Response:    Gilmer tolerated administration well.      Plan:     Continue to monitor per MD/PA/APRN orders; and reevaluate medication effectiveness within 30 minutes.   "

## 2023-09-09 PROCEDURE — 25000003 PHARM REV CODE 250

## 2023-09-09 PROCEDURE — 25000003 PHARM REV CODE 250: Performed by: PSYCHIATRY & NEUROLOGY

## 2023-09-09 PROCEDURE — 11400000 HC PSYCH PRIVATE ROOM

## 2023-09-09 PROCEDURE — S4991 NICOTINE PATCH NONLEGEND: HCPCS | Performed by: PSYCHIATRY & NEUROLOGY

## 2023-09-09 RX ADMIN — HYDROXYZINE HYDROCHLORIDE 50 MG: 50 TABLET, FILM COATED ORAL at 06:09

## 2023-09-09 RX ADMIN — NICOTINE 1 PATCH: 21 PATCH, EXTENDED RELEASE TRANSDERMAL at 08:09

## 2023-09-09 RX ADMIN — HYDROXYZINE HYDROCHLORIDE 50 MG: 50 TABLET, FILM COATED ORAL at 10:09

## 2023-09-09 RX ADMIN — HYDROXYZINE HYDROCHLORIDE 50 MG: 50 TABLET, FILM COATED ORAL at 02:09

## 2023-09-09 RX ADMIN — HYDROXYZINE HYDROCHLORIDE 50 MG: 50 TABLET, FILM COATED ORAL at 07:09

## 2023-09-09 RX ADMIN — OLANZAPINE 2.5 MG: 2.5 TABLET, FILM COATED ORAL at 08:09

## 2023-09-09 RX ADMIN — TRAZODONE HYDROCHLORIDE 150 MG: 50 TABLET ORAL at 08:09

## 2023-09-09 NOTE — NURSING
"Daily Nursing Note:      Behavior:    Patient (Gilmer Carmona is a 23 y.o. female, : 2000, MRN: [id number]) demonstrating an affect that was sad, flat, and anxious. Gilmer demonstrating mood that is depressed and anxious. Gilmer had an appearance that was disheveled. Gilmer denies suicidal ideation. Gilmer denies suicide plan. Gilmer denies homicidal ideation. Gilmer endorses hallucinations.    Gilmer's  height is 5' 6" (1.676 m) and weight is 89.8 kg (198 lb). Her temperature is 98.7 °F (37.1 °C). Her blood pressure is 115/78 and her pulse is 67. Her respiration is 18 and oxygen saturation is 100%.     Kristians last BM was noted on: 23.      Intervention:    Encourage Gilmer to perform self-hygiene, grooming, and changing of clothing. Monitor Gilmer's behavior and program compliance. Monitor Gilmer for suicidal ideation, homicidal ideation, sleep disturbance, and hallucinations. Encourage Gilmer to eat all portions of meals and assess for meal preferences. Monitor Gilmer for intake and output to ensure hydration. Notify the Physician/Physician Assistant/Advance Practice Registered Nurse (MD/PA/APRN) for any medication refusal and any change in patient condition.      Response:    Gilmer verbalizes understand of unit process and procedures.       Plan:     Continue to monitor per MD/PA/APRN orders; maintain patient safety.    "

## 2023-09-09 NOTE — NURSING
"Daily Nursing Note:      Behavior:    Patient (Gilmer Carmona is a 23 y.o. female, : 2000, MRN: [id number]) demonstrating an affect that was flat and anxious. Gilmer demonstrating mood that is anxious. Gilmer had an appearance that was poor hygiene. Gilmer denies suicidal ideation. Gilmer denies suicide plan. Gilmer denies homicidal ideation. Gilmer endorses hallucinations.    Gilmer's  height is 5' 6" (1.676 m) and weight is 89.8 kg (198 lb). Her temperature is 98.7 °F (37.1 °C). Her blood pressure is 115/78 and her pulse is 67. Her respiration is 18 and oxygen saturation is 100%.     Kristians last BM was noted on: _______      Intervention:    Encourage Gilmer to perform self-hygiene, grooming, and changing of clothing. Monitor Gilmer's behavior and program compliance. Monitor Gilmer for suicidal ideation, homicidal ideation, sleep disturbance, and hallucinations. Encourage Gilmer to eat all portions of meals and assess for meal preferences. Monitor Gilmer for intake and output to ensure hydration. Notify the Physician/Physician Assistant/Advance Practice Registered Nurse (MD/PA/APRN) for any medication refusal and any change in patient condition.      Response:    Gilmer verbalizes understand of unit process and procedures. Gilmer reported medications ______.      Plan:     Continue to monitor per MD/PA/APRN orders; maintain patient safety.   "

## 2023-09-10 PROCEDURE — 25000003 PHARM REV CODE 250

## 2023-09-10 PROCEDURE — 25000003 PHARM REV CODE 250: Performed by: PSYCHIATRY & NEUROLOGY

## 2023-09-10 PROCEDURE — 11400000 HC PSYCH PRIVATE ROOM

## 2023-09-10 PROCEDURE — S4991 NICOTINE PATCH NONLEGEND: HCPCS | Performed by: PSYCHIATRY & NEUROLOGY

## 2023-09-10 RX ADMIN — NICOTINE 1 PATCH: 21 PATCH, EXTENDED RELEASE TRANSDERMAL at 08:09

## 2023-09-10 RX ADMIN — HYDROXYZINE HYDROCHLORIDE 50 MG: 50 TABLET, FILM COATED ORAL at 07:09

## 2023-09-10 RX ADMIN — OLANZAPINE 2.5 MG: 2.5 TABLET, FILM COATED ORAL at 08:09

## 2023-09-10 RX ADMIN — ACETAMINOPHEN 650 MG: 325 TABLET ORAL at 01:09

## 2023-09-10 RX ADMIN — HYDROXYZINE HYDROCHLORIDE 50 MG: 50 TABLET, FILM COATED ORAL at 10:09

## 2023-09-10 RX ADMIN — TRAZODONE HYDROCHLORIDE 150 MG: 50 TABLET ORAL at 08:09

## 2023-09-10 NOTE — NURSING
"Daily Nursing Note:      Behavior:    Patient (Gilmer Carmona is a 23 y.o. female, : 2000, MRN: [id number]) demonstrating an affect that was flat and anxious. Gilmer demonstrating mood that is anxious. Gilmer had an appearance that was poor hygiene. Gilmer denies suicidal ideation. Gilmer denies suicide plan. Gilmer denies homicidal ideation. Gilmer denies hallucinations.    Gilmer's  height is 5' 6" (1.676 m) and weight is 89.8 kg (198 lb). Her temperature is 97.8 °F (36.6 °C). Her blood pressure is 118/78 and her pulse is 67. Her respiration is 18 and oxygen saturation is 100%.     Kristians last BM was noted on: _______      Intervention:    Encourage Gilmer to perform self-hygiene, grooming, and changing of clothing. Monitor Gilmer's behavior and program compliance. Monitor Gilmer for suicidal ideation, homicidal ideation, sleep disturbance, and hallucinations. Encourage Gilmer to eat all portions of meals and assess for meal preferences. Monitor Gilmer for intake and output to ensure hydration. Notify the Physician/Physician Assistant/Advance Practice Registered Nurse (MD/PA/APRN) for any medication refusal and any change in patient condition.      Response:    Gilmer verbalizes understand of unit process and procedures. Gilmer reported medications ______.      Plan:     Continue to monitor per MD/PA/APRN orders; maintain patient safety.   "

## 2023-09-10 NOTE — NURSING
"Daily Nursing Note:      Behavior:    Patient (Gilmer Carmona is a 23 y.o. female, : 2000, MRN: [id number]) demonstrating an affect that was sad, flat, anxious, and irritable. Gilmer demonstrating mood that is depressed, anxious, and swings. Gilmer had an appearance that was clean. Gilmer denies suicidal ideation. Gilmer denies suicide plan. Gilmer denies homicidal ideation. Gilmer endorses hallucinations.    Gilmer's  height is 5' 6" (1.676 m) and weight is 89.8 kg (198 lb). Her temperature is 97.8 °F (36.6 °C). Her blood pressure is 118/78 and her pulse is 67. Her respiration is 18 and oxygen saturation is 100%.     Kristians last BM was noted on: 23.      Intervention:    Encourage Gilmer to perform self-hygiene, grooming, and changing of clothing. Monitor Gilmer's behavior and program compliance. Monitor Gilmer for suicidal ideation, homicidal ideation, sleep disturbance, and hallucinations. Encourage Gilmer to eat all portions of meals and assess for meal preferences. Monitor Gilmer for intake and output to ensure hydration. Notify the Physician/Physician Assistant/Advance Practice Registered Nurse (MD/PA/APRN) for any medication refusal and any change in patient condition.      Response:    Gilmer verbalizes understand of unit process and procedures.       Plan:     Continue to monitor per MD/PA/APRN orders; maintain patient safety.    "

## 2023-09-11 PROCEDURE — 25000003 PHARM REV CODE 250: Performed by: PSYCHIATRY & NEUROLOGY

## 2023-09-11 PROCEDURE — 25000003 PHARM REV CODE 250

## 2023-09-11 PROCEDURE — S4991 NICOTINE PATCH NONLEGEND: HCPCS | Performed by: PSYCHIATRY & NEUROLOGY

## 2023-09-11 PROCEDURE — 11400000 HC PSYCH PRIVATE ROOM

## 2023-09-11 RX ADMIN — HYDROXYZINE HYDROCHLORIDE 50 MG: 50 TABLET, FILM COATED ORAL at 02:09

## 2023-09-11 RX ADMIN — NICOTINE 1 PATCH: 21 PATCH, EXTENDED RELEASE TRANSDERMAL at 08:09

## 2023-09-11 RX ADMIN — HYDROXYZINE HYDROCHLORIDE 50 MG: 50 TABLET, FILM COATED ORAL at 09:09

## 2023-09-11 RX ADMIN — TRAZODONE HYDROCHLORIDE 150 MG: 50 TABLET ORAL at 08:09

## 2023-09-11 RX ADMIN — HYDROXYZINE HYDROCHLORIDE 50 MG: 50 TABLET, FILM COATED ORAL at 10:09

## 2023-09-11 NOTE — PROGRESS NOTES
"Inpatient Nutrition Evaluation    Admit Date: 2023   Total duration of encounter: 7 days    Nutrition Recommendation/Prescription     Continue Regular diet as tolerated  Continue Boost Plus if pt sleeps through/skips meals; (provides 360 kcal, 14 g protein per serving)  Medical management of pt reported diarrhea and constipation  Monitor po intake, wt    Nutrition Assessment     Chart Review    Reason Seen: length of stay    Malnutrition Screening Tool Results                Diagnosis: Unspecified Psychotic Disorder  Cannabis use disorder     Relevant Medical History: No past medical history on file    Nutrition-Related Medications: No nutrition related meds noted    Nutrition-Related Labs: -No recent labs noted      Diet Order: Diet Adult Regular  Oral Supplement Order: Boost Plus -- prn  Appetite/Oral Intake: good/% of meals  Factors Affecting Nutritional Intake: constipation and diarrhea  Food/Adventist/Cultural Preferences: none reported  Food Allergies: no known food allergies       Wound(s):   None noted    Comments  : Visited pt; laying on bed in room. Pt reports good po intake, consuming >75% of meals. Pt reports some diarrhea and constipation; states medication given not helping. Pt reports good appetite and no recent wt loss pta; states #.      Anthropometrics    Height: 5' 6" (167.6 cm) Height Method: Stated  Last Weight: 89.8 kg (198 lb) (23 1637) Weight Method: Standard Scale  BMI (Calculated): 32  BMI Classification: obese grade I (BMI 30-34.9)     Ideal Body Weight (IBW), Female: 130 lb     % Ideal Body Weight, Female (lb): 152.31 %                    Usual Body Weight (UBW), k.18 kg (#)  % Usual Body Weight: 132     Usual Weight Provided By: patient    Wt Readings from Last 5 Encounters:   23 89.8 kg (198 lb)   23 90 kg (198 lb 6.6 oz)     Weight Change(s) Since Admission:  Admit Weight: 90 kg (198 lb 6.6 oz) (23)  : # per " pt    Patient Education    Not applicable.    Monitoring & Evaluation     Dietitian will monitor food and beverage intake and weight.  Nutrition Risk/Follow-Up: low (follow-up in 5-7 days)  Patients assigned 'low nutrition risk' status do not qualify for a full nutritional assessment but will be monitored and re-evaluated in a 5-7 day time period. Please consult if re-evaluation needed sooner.

## 2023-09-11 NOTE — NURSING
"PRN Administration Note:    Behavior:    Patient (Gilmer Carmona is a 23 y.o. female, : 2000, MRN: [id number])     Allergies: Patient has no known allergies.    Gilmer's  height is 5' 6" (1.676 m) and weight is 89.8 kg (198 lb). Her temperature is 98.2 °F (36.8 °C). Her blood pressure is 119/79 and her pulse is 75. Her respiration is 18 and oxygen saturation is 100%.     Reason for PRN Administration: _sleep_________.    Intervention:    Administered _trazodone______ per physician order to Gilmer       Response:    Gilmer tolerated administration well.      Plan:     Continue to monitor per MD/PA/APRN orders; and reevaluate medication effectiveness within 30 minutes.   "

## 2023-09-11 NOTE — PROGRESS NOTES
"9/11/2023  Gilmer Carmona   2000   [id number]        Psychiatry Progress Note     Chief Complaint: "It's ok, I guess."    SUBJECTIVE:   Gilmer Carmona is a 23 y.o. female  placed under a PEC at [facility] after being found sleeping under a tree and responding to internal stimuli in the ED.    Tolerating current medication regimen without issues.  No thoughts of self-harm or harm to others noted.  No tremors, rigidity, extrapyramidal symptoms, or excessive sedation were noted at discharge.  Mood improving.  Staff spoke with family who is arranging a bus ticket back to [place] on 9/13/23.  Staff also following up with [facility] in [place] for aftercare.  Will continue current treatment plan and will f/u with discharge plan for 9/13.    UDS: (+)cannabis  Blood alcohol: <10     Current Medications:   Scheduled Meds:    hydrOXYzine HCL  50 mg Oral Q4H While awake    nicotine  1 patch Transdermal Daily    OLANZapine  2.5 mg Oral QHS      PRN Meds: acetaminophen, aluminum-magnesium hydroxide-simethicone, haloperidoL **AND** diphenhydrAMINE **AND** LORazepam **AND** haloperidol lactate **AND** diphenhydrAMINE **AND** lorazepam, magnesium hydroxide 400 mg/5 ml, traZODone   Psychotherapeutics (From admission, onward)      Start     Stop Route Frequency Ordered    09/08/23 1149  traZODone tablet 150 mg         -- Oral Nightly PRN 09/08/23 1149    09/05/23 2100  OLANZapine tablet 2.5 mg         -- Oral Nightly 09/05/23 1012    09/04/23 2102  haloperidoL tablet 10 mg  (Med - Acute  Behavioral Management)        See Hyperspace for full Linked Orders Report.    -- Oral Every 4 hours PRN 09/04/23 2102    09/04/23 2102  LORazepam tablet 2 mg  (Med - Acute  Behavioral Management)        See Hyperspace for full Linked Orders Report.    -- Oral Every 4 hours PRN 09/04/23 2102    09/04/23 2102  haloperidol lactate injection 10 mg  (Med - Acute  Behavioral Management)        See Hyperspace for full Linked Orders Report.    -- IM Every 4 " "hours PRN 09/04/23 2102 09/04/23 2102  LORazepam injection 2 mg  (Med - Acute  Behavioral Management)        See Hyperspace for full Linked Orders Report.    -- IM Every 4 hours PRN 09/04/23 2102            Allergies:   Review of patient's allergies indicates:  No Known Allergies     OBJECTIVE:   Vitals   Vitals:    09/10/23 0701   BP: 119/79   Pulse: 75   Resp: 18   Temp: 98.2 °F (36.8 °C)        Labs/Imaging/Studies:   No results found for this or any previous visit (from the past 36 hour(s)).         Medical Review Of Systems:  Constitutional: negative  Respiratory: negative  Cardiovascular: negative  Gastrointestinal: negative  Genitourinary:negative  Musculoskeletal:negative  Neurological: negative      Psychiatric Mental Status Exam:  General Appearance: appears stated age, well-developed, well-nourished  Arousal: alert  Behavior: cooperative  Movements and Motor Activity: no abnormal involuntary movements noted  Orientation: oriented to person, place, time, and situation  Speech: normal rate, normal rhythm, normal volume, normal tone  Mood: "Ok"  Affect: constricted  Thought Process: linear  Associations: intact  Thought Content and Perceptions: no suicidal ideation, no homicidal ideation, no auditory hallucinations, no visual hallucinations, no paranoid ideation, no ideas of reference  Recent and Remote Memory: recent memory intact, remote memory intact; per interview/observation with patient  Attention and Concentration: intact, attentive to conversation; per interview/observation with patient  Fund of Knowledge: intact, aware of current events, vocabulary appropriate; based on history, vocabulary, fund of knowledge, syntax, grammar, and content  Insight: intact; based on understanding of severity of illness and HPI  Judgment: intact; based on patient's behavior and HPI        ASSESSMENT/PLAN:   Problems Addressed/Diagnoses:  Unspecified Psychotic Disorder (F29)  Cannabis use disorder (F12.20)    No past " medical history on file.     Plan:  Psychosis  -Continue Zyprexa    Cannabis use  -Group/Individual psychotherapy      Expected Disposition Plan: Home        Sergio Morrow M.D.

## 2023-09-11 NOTE — NURSING
"Daily Nursing Note:      Behavior:    Patient (Gilmer Carmona is a 23 y.o. female, : 2000, MRN: [id number]) demonstrating an affect that was sad, flat, and anxious. Gilmer demonstrating mood that is depressed and anxious. Gilmer had an appearance that was clean. Gilmer denies suicidal ideation. Gilmer denies suicide plan. Gilmer denies homicidal ideation. Gilmer denies hallucinations.    Gilmer's  height is 5' 6" (1.676 m) and weight is 89.8 kg (198 lb). Her temperature is 98.2 °F (36.8 °C). Her blood pressure is 119/79 and her pulse is 75. Her respiration is 18 and oxygen saturation is 100%.     Kristians last BM was noted on: 9/10/23.      Intervention:    Encourage Gilmer to perform self-hygiene, grooming, and changing of clothing. Monitor Gilmer's behavior and program compliance. Monitor Gilmer for suicidal ideation, homicidal ideation, sleep disturbance, and hallucinations. Encourage Gilmer to eat all portions of meals and assess for meal preferences. Monitor Gilmer for intake and output to ensure hydration. Notify the Physician/Physician Assistant/Advance Practice Registered Nurse (MD/PA/APRN) for any medication refusal and any change in patient condition.      Response:    Gilmer verbalizes understand of unit process and procedures.       Plan:     Continue to monitor per MD/PA/APRN orders; maintain patient safety.    "

## 2023-09-11 NOTE — NURSING
"PRN Medication Follow-up Note:    Behavior:    Patient (Gilmer Carmona is a 23 y.o. female, : 2000, MRN: [id number])     Allergies: Patient has no known allergies.    Gilmer's  height is 5' 6" (1.676 m) and weight is 89.8 kg (198 lb). Her temperature is 98.2 °F (36.8 °C). Her blood pressure is 119/79 and her pulse is 75. Her respiration is 18 and oxygen saturation is 100%.     Administered _trazodone______ per physician order to Gilmer       Intervention:    Intervention to Gilmer's response: _none needed________.       Response:    Gilmer's response: no further c/o voiced_________      Plan:     Continue to monitor per MD/PA/APRN orders; and reevaluate medication effectiveness within 30 minutes.   "

## 2023-09-11 NOTE — NURSING
"Daily Nursing Note:      Behavior:    Patient (Gilmer Carmona is a 23 y.o. female, : 2000, MRN: [id number]) demonstrating an affect that was flat and anxious. Gilmer demonstrating mood that is anxious. Gilmer had an appearance that was clean. Gilmer denies suicidal ideation. Gilmer denies suicide plan. Gilmer denies homicidal ideation. Gilmer endorses hallucinations.    Gilmer's  height is 5' 6" (1.676 m) and weight is 89.8 kg (198 lb). Her temperature is 98.2 °F (36.8 °C). Her blood pressure is 119/79 and her pulse is 75. Her respiration is 18 and oxygen saturation is 100%.     Kristians last BM was noted on: _______      Intervention:    Encourage Gilmer to perform self-hygiene, grooming, and changing of clothing. Monitor Gilmer's behavior and program compliance. Monitor Gilmer for suicidal ideation, homicidal ideation, sleep disturbance, and hallucinations. Encourage Gilmer to eat all portions of meals and assess for meal preferences. Monitor Gilmer for intake and output to ensure hydration. Notify the Physician/Physician Assistant/Advance Practice Registered Nurse (MD/PA/APRN) for any medication refusal and any change in patient condition.      Response:    Gilmer verbalizes understand of unit process and procedures. Gilmer reported medications ______.      Plan:     Continue to monitor per MD/PA/APRN orders; maintain patient safety.   "

## 2023-09-12 PROCEDURE — 25000003 PHARM REV CODE 250: Performed by: PSYCHIATRY & NEUROLOGY

## 2023-09-12 PROCEDURE — 11400000 HC PSYCH PRIVATE ROOM

## 2023-09-12 PROCEDURE — 25000003 PHARM REV CODE 250

## 2023-09-12 PROCEDURE — S4991 NICOTINE PATCH NONLEGEND: HCPCS | Performed by: PSYCHIATRY & NEUROLOGY

## 2023-09-12 RX ORDER — IBUPROFEN 200 MG
1 TABLET ORAL DAILY
Qty: 28 PATCH | Refills: 0 | Status: SHIPPED | OUTPATIENT
Start: 2023-09-13 | End: 2023-10-11

## 2023-09-12 RX ORDER — HYDROXYZINE HYDROCHLORIDE 50 MG/1
50 TABLET, FILM COATED ORAL
Qty: 150 TABLET | Refills: 0 | Status: SHIPPED | OUTPATIENT
Start: 2023-09-12 | End: 2023-10-12

## 2023-09-12 RX ORDER — OLANZAPINE 2.5 MG/1
2.5 TABLET ORAL NIGHTLY
Qty: 30 TABLET | Refills: 0 | Status: SHIPPED | OUTPATIENT
Start: 2023-09-12 | End: 2023-10-12

## 2023-09-12 RX ADMIN — TRAZODONE HYDROCHLORIDE 150 MG: 50 TABLET ORAL at 08:09

## 2023-09-12 RX ADMIN — HYDROXYZINE HYDROCHLORIDE 50 MG: 50 TABLET, FILM COATED ORAL at 12:09

## 2023-09-12 RX ADMIN — HYDROXYZINE HYDROCHLORIDE 50 MG: 50 TABLET, FILM COATED ORAL at 10:09

## 2023-09-12 RX ADMIN — NICOTINE 1 PATCH: 21 PATCH, EXTENDED RELEASE TRANSDERMAL at 08:09

## 2023-09-12 RX ADMIN — HYDROXYZINE HYDROCHLORIDE 50 MG: 50 TABLET, FILM COATED ORAL at 05:09

## 2023-09-12 NOTE — NURSING
"PRN Medication Follow-up Note:    Behavior:    Patient (Gilmer Carmona is a 23 y.o. female, : 2000, MRN: [id number])     Allergies: Patient has no known allergies.    Gilmer's  height is 5' 6" (1.676 m) and weight is 89.8 kg (198 lb). Her temperature is 98.1 °F (36.7 °C). Her blood pressure is 106/65 and her pulse is 95. Her respiration is 16 and oxygen saturation is 100%.     Administered _trazodone______ per physician order to Gilmer       Intervention:    Intervention to Gilmer's response: _none needed________.       Response:    Gilmer's response: no further c/o voiced_________      Plan:     Continue to monitor per MD/PA/APRN orders; and reevaluate medication effectiveness within 30 minutes.   "

## 2023-09-12 NOTE — NURSING
"PRN Administration Note:    Behavior:    Patient (Gilmer Carmona is a 23 y.o. female, : 2000, MRN: [id number])     Allergies: Patient has no known allergies.    Gilmer's  height is 5' 6" (1.676 m) and weight is 89.8 kg (198 lb). Her temperature is 98.6 °F (37 °C). Her blood pressure is 150/84 (abnormal) and her pulse is 67. Her respiration is 16 and oxygen saturation is 98%.     Reason for PRN Administration: Anxiety 10/10.    Intervention:    Administered  Atarax 50 mg orally per physician order to Gilmer       Response:    Gilmer tolerated administration well.      Plan:     Continue to monitor per MD/PA/APRN orders; and reevaluate medication effectiveness within 30 minutes.   "

## 2023-09-12 NOTE — PROGRESS NOTES
"9/12/2023  Gilmer Carmona   2000   [id number]        Psychiatry Progress Note     Chief Complaint: "It's ok, I guess."    SUBJECTIVE:   Gilmer Carmona is a 23 y.o. female  placed under a PEC at [facility] after being found sleeping under a tree and responding to internal stimuli in the ED.    Today patient states that she is feeling good. Tolerating current medication regimen without issues.  No thoughts of self-harm or harm to others noted.  No tremors, rigidity, extrapyramidal symptoms, or excessive sedation were noted at discharge. Patient will return to [place] with family and  will be following up with [facility] for aftercare.  Will continue current treatment plan and will f/u with discharge plan for 9/13.    UDS: (+)cannabis  Blood alcohol: <10     Current Medications:   Scheduled Meds:    hydrOXYzine HCL  50 mg Oral Q4H While awake    nicotine  1 patch Transdermal Daily    OLANZapine  2.5 mg Oral QHS      PRN Meds: acetaminophen, aluminum-magnesium hydroxide-simethicone, haloperidoL **AND** diphenhydrAMINE **AND** LORazepam **AND** haloperidol lactate **AND** diphenhydrAMINE **AND** lorazepam, magnesium hydroxide 400 mg/5 ml, traZODone   Psychotherapeutics (From admission, onward)      Start     Stop Route Frequency Ordered    09/08/23 1149  traZODone tablet 150 mg         -- Oral Nightly PRN 09/08/23 1149    09/05/23 2100  OLANZapine tablet 2.5 mg         -- Oral Nightly 09/05/23 1012    09/04/23 2102  haloperidoL tablet 10 mg  (Med - Acute  Behavioral Management)        See Hyperspace for full Linked Orders Report.    -- Oral Every 4 hours PRN 09/04/23 2102    09/04/23 2102  LORazepam tablet 2 mg  (Med - Acute  Behavioral Management)        See Hyperspace for full Linked Orders Report.    -- Oral Every 4 hours PRN 09/04/23 2102    09/04/23 2102  haloperidol lactate injection 10 mg  (Med - Acute  Behavioral Management)        See Hyperspace for full Linked Orders Report.    -- IM Every 4 hours PRN 09/04/23 2102 " "   09/04/23 2102  LORazepam injection 2 mg  (Med - Acute  Behavioral Management)        See [place] for full Linked Orders Report.    -- IM Every 4 hours PRN 09/04/23 2102            Allergies:   Review of patient's allergies indicates:  No Known Allergies     OBJECTIVE:   Vitals   Vitals:    09/12/23 0701   BP: (!) 150/84   Pulse: 67   Resp: 16   Temp: 98.6 °F (37 °C)        Labs/Imaging/Studies:   No results found for this or any previous visit (from the past 36 hour(s)).         Medical Review Of Systems:  Constitutional: negative  Respiratory: negative  Cardiovascular: negative  Gastrointestinal: negative  Genitourinary:negative  Musculoskeletal:negative  Neurological: negative      Psychiatric Mental Status Exam:  General Appearance: appears stated age, well-developed, well-nourished  Arousal: alert  Behavior: cooperative  Movements and Motor Activity: no abnormal involuntary movements noted  Orientation: oriented to person, place, time, and situation  Speech: normal rate, normal rhythm, normal volume, normal tone  Mood: "Ok"  Affect: constricted  Thought Process: linear  Associations: intact  Thought Content and Perceptions: no suicidal ideation, no homicidal ideation, no auditory hallucinations, no visual hallucinations, no paranoid ideation, no ideas of reference  Recent and Remote Memory: recent memory intact, remote memory intact; per interview/observation with patient  Attention and Concentration: intact, attentive to conversation; per interview/observation with patient  Fund of Knowledge: intact, aware of current events, vocabulary appropriate; based on history, vocabulary, fund of knowledge, syntax, grammar, and content  Insight: intact; based on understanding of severity of illness and HPI  Judgment: intact; based on patient's behavior and HPI        ASSESSMENT/PLAN:   Problems Addressed/Diagnoses:  Unspecified Psychotic Disorder (F29)  Cannabis use disorder (F12.20)    No past medical history on " file.     Plan:  Psychosis  -Continue Zyprexa    Cannabis use  -Group/Individual psychotherapy      Expected Disposition Plan: Home        Liam Peoples, [email or web address]

## 2023-09-12 NOTE — NURSING
"PRN Administration Note:    Behavior:    Patient (Gilmer Carmona is a 23 y.o. female, : 2000, MRN: [id number])     Allergies: Patient has no known allergies.    Gilmer's  height is 5' 6" (1.676 m) and weight is 89.8 kg (198 lb). Her temperature is 98.6 °F (37 °C). Her blood pressure is 150/84 (abnormal) and her pulse is 67. Her respiration is 16 and oxygen saturation is 98%.     Reason for PRN Administration: Anxiety 10/10    Intervention:    Administered Atarax 50 mg orally per physician order to Gilmer       Response:    Gilmer tolerated administration well.      Plan:     Continue to monitor per MD/PA/APRN orders; and reevaluate medication effectiveness within 30 minutes.   "

## 2023-09-12 NOTE — NURSING
"Daily Nursing Note:      Behavior:    Patient (Gilmer Carmona is a 23 y.o. female, : 2000, MRN: [id number]) demonstrating an affect that was flat and anxious. Gilmer demonstrating mood that is anxious. Gilmer had an appearance that was clean. Gilmer denies suicidal ideation. Gilmer denies suicide plan. Gilmer denies homicidal ideation. Gilmer denies hallucinations.    Gilmer's  height is 5' 6" (1.676 m) and weight is 89.8 kg (198 lb). Her temperature is 98.1 °F (36.7 °C). Her blood pressure is 106/65 and her pulse is 95. Her respiration is 16 and oxygen saturation is 100%.     Kristians last BM was noted on: _______      Intervention:    Encourage Gilmer to perform self-hygiene, grooming, and changing of clothing. Monitor Gilmer's behavior and program compliance. Monitor Gilmer for suicidal ideation, homicidal ideation, sleep disturbance, and hallucinations. Encourage Gilmer to eat all portions of meals and assess for meal preferences. Monitor Gilmer for intake and output to ensure hydration. Notify the Physician/Physician Assistant/Advance Practice Registered Nurse (MD/PA/APRN) for any medication refusal and any change in patient condition.      Response:    Gilmer verbalizes understand of unit process and procedures. Gilmer reported medications ______.      Plan:     Continue to monitor per MD/PA/APRN orders; maintain patient safety.   "

## 2023-09-12 NOTE — NURSING
"PRN Administration Note:    Behavior:    Patient (Gilmer Carmona is a 23 y.o. female, : 2000, MRN: [id number])     Allergies: Patient has no known allergies.    Gilmer's  height is 5' 6" (1.676 m) and weight is 89.8 kg (198 lb). Her temperature is 98.1 °F (36.7 °C). Her blood pressure is 106/65 and her pulse is 95. Her respiration is 16 and oxygen saturation is 100%.     Reason for PRN Administration: _sleep_________.    Intervention:    Administered _trazodone______ per physician order to Gilmer       Response:    Gilmer tolerated administration well.      Plan:     Continue to monitor per MD/PA/APRN orders; and reevaluate medication effectiveness within 30 minutes.   "

## 2023-09-12 NOTE — NURSING
"PRN Medication Follow-up Note:    Behavior:    Patient (Gilmer Carmona is a 23 y.o. female, : 2000, MRN: [id number])     Allergies: Patient has no known allergies.    Kristians  height is 5' 6" (1.676 m) and weight is 89.8 kg (198 lb). Her temperature is 98.6 °F (37 °C). Her blood pressure is 150/84 (abnormal) and her pulse is 67. Her respiration is 16 and oxygen saturation is 98%.     Administered Atarax 50 mg orally per physician order to Gilmer       Intervention:    Intervention to Gilmer's response: No further complaints of anxiety.       Response:    Gilmer's response: Medication effective      Plan:     Continue to monitor per MD/PA/APRN orders; and reevaluate medication effectiveness within 30 minutes.   "

## 2023-09-13 VITALS
RESPIRATION RATE: 16 BRPM | DIASTOLIC BLOOD PRESSURE: 74 MMHG | WEIGHT: 198 LBS | BODY MASS INDEX: 31.82 KG/M2 | OXYGEN SATURATION: 99 % | HEART RATE: 74 BPM | HEIGHT: 66 IN | TEMPERATURE: 99 F | SYSTOLIC BLOOD PRESSURE: 126 MMHG

## 2023-09-13 PROCEDURE — 25000003 PHARM REV CODE 250

## 2023-09-13 RX ORDER — TRAZODONE HYDROCHLORIDE 150 MG/1
150 TABLET ORAL NIGHTLY PRN
Qty: 30 TABLET | Refills: 0 | Status: SHIPPED | OUTPATIENT
Start: 2023-09-13 | End: 2024-09-12

## 2023-09-13 RX ADMIN — HYDROXYZINE HYDROCHLORIDE 50 MG: 50 TABLET, FILM COATED ORAL at 07:09

## 2023-09-13 RX ADMIN — HYDROXYZINE HYDROCHLORIDE 50 MG: 50 TABLET, FILM COATED ORAL at 12:09

## 2023-09-13 NOTE — NURSING
"PRN Medication Follow-up Note:    Behavior:    Patient (Gilmer Carmona is a 23 y.o. female, : 2000, MRN: [id number])     Allergies: Patient has no known allergies.    Gilmer's  height is 5' 6" (1.676 m) and weight is 89.8 kg (198 lb). Her temperature is 98.6 °F (37 °C). Her blood pressure is 150/84 (abnormal) and her pulse is 67. Her respiration is 16 and oxygen saturation is 98%.     Administered _trazodone______ per physician order to Gilmer       Intervention:    Intervention to Gilmer's response: none needed_________.       Response:    Gilmer's response: no further c/o voiced_________      Plan:     Continue to monitor per MD/PA/APRN orders; and reevaluate medication effectiveness within 30 minutes.   "

## 2023-09-13 NOTE — DISCHARGE SUMMARY
"DISCHARGE SUMMARY  PSYCHIATRY      Admit Date: 9/4/2023  9:00 PM    Discharge Date:  9/13/2023    SITE:   [facility] *  [facility]    Discharge Attending Physician: Sergio Morrow M.D.    Chief Complaint:  "Good"    History of Present Illness On Admit:   Gilmer Carmona is a 23 y.o. female placed under a PEC at [facility] after being found sleeping under a tree. She told the ED that she was dehydrated and needed fluid. Patient states that she does not know why they sent her here,but endorses that they told her she was coming here to be evaluated. She states that there is nothing wrong with her. Upon further pressing she states that she was in a mental health facility in [place] two weeks ago but for a different reason, however she would not discuss the reason. Patient states that she is taking Vraylar but did not know or would not discuss what she has been diagnosed with in the past. Patient states that she is from [place] where she sees a Dr there for her mental health. She states that her mother is sick and this is what brought her to [place]. She endorses that her mother is crazy and has multiple mental health issues, again she refused to discuss further what issues this included. Patient was focused on being discharged and minimizing her symptoms. She was calm and pleasant during this interview but very guarded. She asked to remain on her Vraylar although she did also mention that she only takes it to make people happy because she does not need any medication. Will admit for medication management and safety monitoring.       Admit Mental Status Exam:  General Appearance: appears stated age, well developed and nourished, adequately groomed and appropriately dressed, in no acute distress  Arousal: alert with clear sensorium  Behavior: pleasant, polite, appropriate eye-contact, under good behavioral control, uncooperative  Movements and Motor Activity: no abnormal involuntary " movements noted; no tics, no tremors, no akathisia, no dystonia, no evidence of tardive dyskinesia; no psychomotor agitation or retardation  Orientation: intact; oriented fully to person, place, time and situation  Speech: intact; normal rate, rhythm, volume, tone and pitch; conversational, spontaneous, and coherent  Mood: Anxious and Guarded  Affect: anxious, irritable, calm  Thought Process: linear, goal-directed, organized, circumstantial  Associations: intact, no loosening of associations  Thought Content and Perceptions: no suicidal or homicidal ideation, no auditory or visual hallucinations, no paranoid ideation, no ideas of reference, no evidence of delusions or psychosis  Recent and Remote Memory: grossly intact, able to recall relevant and salient information from the recent and remote past; per interview/observation with patient  Attention and Concentration: grossly intact, attentive to the conversation and not readily distractible; per interview/observation with patient  Fund of Knowledge: grossly intact, used appropriate vocabulary and demonstrated an awareness of current events; based on history, vocabulary, fund of knowledge, syntax, grammar, and content  Insight: questionable; based on understanding of severity of illness and HPI  Judgment: questionable; based on patient's behavior and HPI      Diagnoses:  PRINCIPAL PROBLEM:  Psychotic disorder      PROBLEM LIST    Psychotic disorder    Cannabis dependence, continuous        Hospital Course:   Patient was admitted to [facility] and was started on Zyprexa.    23  Patient states that she has been in [place] since March and had been living with her mother in her grandmother's house (grandmother ), but is now living with her sister..  She was in [place] prior and was being treated for psychiatric illness.  She had not established care with a mental health provider here.  Sleeping and eating appropriately per patient.  Has not spoken to anyone in  "her family since her arrival but states that she needs to call her sister.     Of note, she did not receive the Zyprexa that was started yesterday.  She has spent her time here sleeping thus far.  Will f/u with progress and will augment care as needed.     UDS: (+)cannabis  Blood alcohol: <10      9/7/23  Today patient states that she is feeling "fine". She has not taken the Zyprexa because it is not the Vraylar she was prescribed outpatient. Vraylar is not on formulary or available to us at [facility]. I educated her on the Zyprexa and she agreed to use this medication until she is discharged and able to follow up with her provider to be placed back on Vraylar.Staff reported that she continues to respond to internal stimuli. Yesterday staff attempted an interview with her and she became evasive and began responding to internal stimuli AEB talking to herself, becoming verbal aggressive, hostile, and threatening CTRS, requiring security to attend interview. She continues to minimize her symptoms and the event that led to her admission, however she does not show signs of psychosis or cynthia at this time. She is calm and cooperative during this exam. Will f/u with progress and will augment care as needed.      9/8/23  Today patient states that she is feeling "good". She did begin taken the Zyprexa and states that she is tolerating this well. Staff reported that she continues to respond to internal stimuli. Staff did speak with family and put in place a plan to be discharged back to [facility] in [place]. Her mother states that she is currently attempting to obtain POA. Her mood today is calm and she appears to be improving. Staff report that she continues to respond to internal stimuli. She is calm and cooperative during this exam. She does indicate trouble sleeping. Will f/u with progress and will augment care as needed.      9/11/23  Tolerating current medication regimen without issues.  No thoughts of self-harm or " harm to others noted.  No tremors, rigidity, extrapyramidal symptoms, or excessive sedation were noted at discharge.  Mood improving.  Staff spoke with family who is arranging a bus ticket back to [place] on 9/13/23.  Staff also following up with [facility] in [place] for aftercare.  Will continue current treatment plan and will f/u with discharge plan for 9/13.      9/12/23  Today patient states that she is feeling good. Tolerating current medication regimen without issues.  No thoughts of self-harm or harm to others noted.  No tremors, rigidity, extrapyramidal symptoms, or excessive sedation were noted at discharge. Patient will return to [place] with family and  will be following up with [facility] for aftercare.  Will continue current treatment plan and will f/u with discharge plan for 9/13.      9/13/23  Due for discharge back to [place] today.  Tolerating current medication regimen without issues.  At the time of discharge, no thoughts of self-harm or harm to others noted.  No tremors, rigidity, extrapyramidal symptoms, or excessive sedation were noted at discharge.  No current hallucinations or delusions noted.  Will proceed with discharge.      Current Medications:   Scheduled Meds:    hydrOXYzine HCL  50 mg Oral Q4H While awake    nicotine  1 patch Transdermal Daily    OLANZapine  2.5 mg Oral QHS          DISCHARGE EXAMINATION    VITALS   Vitals:    09/10/23 0701 09/11/23 0701 09/11/23 1100 09/12/23 0701   BP: 119/79 118/78 106/65 (!) 150/84   Pulse: 75 72 95 67   Resp: 18 19 16 16   Temp: 98.2 °F (36.8 °C) 98.4 °F (36.9 °C) 98.1 °F (36.7 °C) 98.6 °F (37 °C)   TempSrc:       SpO2:    98%   Weight:       Height:             Discharge Mental Status Exam:  General Appearance: appears stated age, well-developed, well-nourished  Arousal: alert  Behavior: cooperative  Movements and Motor Activity: no abnormal involuntary movements noted  Orientation: oriented to person, place, time, and  "situation  Speech: normal rate, normal rhythm, normal volume, normal tone  Mood: "Ok"  Affect: constricted  Thought Process: linear  Associations: intact  Thought Content and Perceptions: no suicidal ideation, no homicidal ideation, no auditory hallucinations, no visual hallucinations, no paranoid ideation, no ideas of reference  Recent and Remote Memory: recent memory intact, remote memory intact; per interview/observation with patient  Attention and Concentration: intact, attentive to conversation; per interview/observation with patient  Fund of Knowledge: intact, aware of current events, vocabulary appropriate; based on history, vocabulary, fund of knowledge, syntax, grammar, and content  Insight: intact; based on understanding of severity of illness and HPI  Judgment: intact; based on patient's behavior and HPI      Discharge Condition:  Stable    Prognosis:  Fair    Justification for >1 antipsychotic:  N/a    Disposition:  discharged to home    Follow-up:     Follow-up Information       [facility].    Why: Pt is able to walk in at any time/date.  Contact information:  [phone] (FAX)  [address] [facility] [place] [address]                           Medication Regimen:    Current Facility-Administered Medications:     acetaminophen tablet 650 mg, 650 mg, Oral, Q6H PRN, Sergio Morrow MD, 650 mg at 09/10/23 1347    aluminum-magnesium hydroxide-simethicone 200-200-20 mg/5 mL suspension 30 mL, 30 mL, Oral, Q6H PRN, Sergio Morrow MD, 30 mL at 09/08/23 1806    haloperidoL tablet 10 mg, 10 mg, Oral, Q4H PRN **AND** diphenhydrAMINE capsule 50 mg, 50 mg, Oral, Q4H PRN **AND** LORazepam tablet 2 mg, 2 mg, Oral, Q4H PRN **AND** haloperidol lactate injection 10 mg, 10 mg, Intramuscular, Q4H PRN, 10 mg at 09/06/23 1035 **AND** diphenhydrAMINE injection 50 mg, 50 mg, Intramuscular, Q4H PRN, 50 mg at 09/06/23 1035 **AND** LORazepam injection 2 mg, 2 mg, Intramuscular, Q4H " PRN, Sergio Morrow MD, 2 mg at 09/06/23 1035    hydrOXYzine tablet 50 mg, 50 mg, Oral, Q4H While awake, Liam Peoples PMHNP, 50 mg at 09/13/23 0703    magnesium hydroxide 400 mg/5 ml suspension 2,400 mg, 30 mL, Oral, Daily PRN, Fadi Lane MD, 2,400 mg at 09/08/23 1327    nicotine 21 mg/24 hr 1 patch, 1 patch, Transdermal, Daily, Sergio Morrow MD, 1 patch at 09/12/23 0852    OLANZapine tablet 2.5 mg, 2.5 mg, Oral, QHS, Liam Peoples PMHNP, 2.5 mg at 09/10/23 2018    traZODone tablet 150 mg, 150 mg, Oral, Nightly PRN, Liam Peoples PMHNP, 150 mg at 09/12/23 2008      Patient Instructions:   Continue medication regimen as prescribed.    Disposition plan per  - see  notes for details.    Patient instructed to call 911 or present to emergency department if any of the following complications develop status post discharge: suicidality, homicidality, or grave disability.     Total time spent discharging patient: <30 minutes      Sergio Morrow M.D.

## 2023-09-13 NOTE — PLAN OF CARE
Problem: Cognitive Impairment (Psychotic Signs/Symptoms)  Goal: Optimal Cognitive Function (Psychotic Signs/Symptoms)  Outcome: Met     Problem: Decreased Participation and Engagement (Psychotic Signs/Symptoms)  Goal: Increased Participation and Engagement (Psychotic Signs/Symptoms)  Outcome: Met     Problem: Mood Impairment (Psychotic Signs/Symptoms)  Goal: Improved Mood Symptoms (Psychotic Signs/Symptoms)  Outcome: Met     Problem: Psychomotor Impairment (Psychotic Signs/Symptoms)  Goal: Improved Psychomotor Symptoms (Psychotic Signs/Symptoms)  Outcome: Met     Problem: Sensory Perception Impairment (Psychotic Signs/Symptoms)  Goal: Decreased Sensory Symptoms (Psychotic Signs/Symptoms)  Outcome: Met     Problem: Sleep Disturbance (Psychotic Signs/Symptoms)  Goal: Improved Sleep (Psychotic Signs/Symptoms)  Outcome: Met     Problem: Social, Occupational or Functional Impairment (Psychotic Signs/Symptoms)  Goal: Enhanced Social, Occupational or Functional Skills (Psychotic Signs/Symptoms)  Outcome: Met     Problem: Violence Risk or Actual  Goal: Anger and Impulse Control  Outcome: Met     Problem: Activity and Energy Impairment (Excessive Substance Use)  Goal: Optimized Energy Level (Excessive Substance Use)  Outcome: Met     Problem: Behavior Regulation Impairment (Excessive Substance Use)  Goal: Improved Behavioral Control (Excessive Substance Use)  Outcome: Met     Problem: Decreased Participation and Engagement (Excessive Substance Use)  Goal: Increased Participation and Engagement (Excessive Substance Use)  Outcome: Met     Problem: Physiologic Impairment (Excessive Substance Use)  Goal: Improved Physiologic Symptoms (Excessive Substance Use)  Outcome: Met     Problem: Social, Occupational or Functional Impairment (Excessive Substance Use)  Goal: Enhanced Social, Occupational or Functional Skills (Excessive Substance Use)  Outcome: Met

## 2023-09-13 NOTE — PLAN OF CARE
Harris met her TR treatment goal of self-esteem  CTRS Discharge Recommendations:  Encouraged Pt. to actively utilize available community resources to increase leisure involvement to decrease signs and symptoms of illness.  Encouraged Pt. to utilize coping skills on a regular basis to reduce the risk of decomposition and re-hospitalization.

## 2023-09-13 NOTE — CARE UPDATE
Jacquelyn received a call from Nehal with the access department at [facility] who informed me I was given the wrong information and pt has to to through [facility] for outpatient services. Due to pt being dc from their system less than a year ago she has to talk to someone within customer service at [facility] to restart services. JACQUELYN called and left a vm at the number [phone] to restart those services for pt.

## 2023-09-13 NOTE — NURSING
"Daily Nursing Note:      Behavior:    Patient (Gilmer Carmona is a 23 y.o. female, : 2000, MRN: [id number]) demonstrating an affect that was flat and anxious. Gilmer demonstrating mood that is anxious. Gilmer had an appearance that was clean. Gilmer denies suicidal ideation. Gilmer denies suicide plan. Gilmer denies homicidal ideation. Gilmer denies hallucinations.    Gilmer's  height is 5' 6" (1.676 m) and weight is 89.8 kg (198 lb). Her temperature is 98.6 °F (37 °C). Her blood pressure is 150/84 (abnormal) and her pulse is 67. Her respiration is 16 and oxygen saturation is 98%.     Kristians last BM was noted on: _______      Intervention:    Encourage Gilmer to perform self-hygiene, grooming, and changing of clothing. Monitor Gilmer's behavior and program compliance. Monitor Gilmer for suicidal ideation, homicidal ideation, sleep disturbance, and hallucinations. Encourage Gilmer to eat all portions of meals and assess for meal preferences. Monitor Gilmer for intake and output to ensure hydration. Notify the Physician/Physician Assistant/Advance Practice Registered Nurse (MD/PA/APRN) for any medication refusal and any change in patient condition.      Response:    Gilmer verbalizes understand of unit process and procedures. Gilmer reported medications ______.      Plan:     Continue to monitor per MD/PA/APRN orders; maintain patient safety.   "

## 2023-09-13 NOTE — NURSING
"PRN Administration Note:    Behavior:    Patient (Gilmer Carmona is a 23 y.o. female, : 2000, MRN: [id number])     Allergies: Patient has no known allergies.    Gilmer's  height is 5' 6" (1.676 m) and weight is 89.8 kg (198 lb). Her temperature is 98.6 °F (37 °C). Her blood pressure is 150/84 (abnormal) and her pulse is 67. Her respiration is 16 and oxygen saturation is 98%.     Reason for PRN Administration: _sleep_________.    Intervention:    Administered _trazodone______ per physician order to Gilmer       Response:    Gilmer tolerated administration well.      Plan:     Continue to monitor per MD/PA/APRN orders; and reevaluate medication effectiveness within 30 minutes.   "

## 2023-09-13 NOTE — GROUP NOTE
Group Psychotherapy       Group Focus: Communication Skills    Group topic: Treatment Planning. Therapist explored patients need for identifying personal strengths and qualities in working towards mental health goals.      Number of patients in attendance: 4    Group Start Time: 1045  Group End Time:  1130  Groups Date: 9/5/2023  Group Topic:  Behavioral Health  Group Department: [facility]  Group Facilitators:  Kelli Vogel  _____________________________________________________________________    Patient Name: Gilmer Carmona  MRN: [id number]  Patient Class: IP- Psych   Admission Date\Time: 9/4/2023  9:00 PM  Hospital Length of Stay: 1  Primary Care Provider: Keyona, Primary Doctor     Referred by: Acute Psychiatry Unit Treatment Team     Target symptoms: Depression     Patient's response to treatment: Pt did not attend group; alternative provided     Progress toward goals: Minimal progress     Interval History:      Diagnosis:      Plan: Continue treatment on APU    
Group Psychotherapy       Group Focus: Emotion Regulation Skills      Number of patients in attendance: 1    Review of Emotion Regulation Handout 1: Goals of Emotion Regulation Training, Handout 2: Myths about Emotions, and Handout 3: Model for Describing Emotions.     Mental Health Tech reminded each patient that group was starting. Only 1 attended group. Handout 1 and 2 were provided to patients not in attendance.     Group Start Time: 1045  Group End Time:  1130  Groups Date: 9/6/2023  Group Topic:  Behavioral Health  Group Department: [facility]al [facility]  Group Facilitators:  Claudia Parks SSW   _____________________________________________________________________    Patient Name: Gilmer Carmona  MRN: [id number]  Patient Class: IP- Psych   Admission Date\Time: 9/4/2023  9:00 PM  Hospital Length of Stay: 2  Primary Care Provider: Keyona Primary Doctor     Referred by: Acute Psychiatry Unit Treatment Team     Target symptoms: Poor Coping Skills     Patient's response to treatment: did not attend     Progress toward goals: Not progressing     Interval History:      Diagnosis:      Plan: Continue treatment on APU    
Group Psychotherapy       Group Focus: Life Skills      Number of patients in attendance: 10    Group focus on emotion regulation skills. Completed worksheet on ways to describe emotions, focus on anger and love. Review of prompting events, interpretations of events, biological changes, expressions of emotions, and aftereffects of emotions.     Group Start Time: 1045  Group End Time:  1130  Groups Date: 9/13/2023  Group Topic:  Behavioral Health  Group Department: [facility] - Behavioral Health Unit  Group Facilitators:  Claudia Parks SSW   _____________________________________________________________________    Patient Name: Gilmer Carmona  MRN: [id number]  Patient Class: IP- Psych   Admission Date\Time: 9/4/2023  9:00 PM  Hospital Length of Stay: 9  Primary Care Provider: Keyona, Primary Doctor     Referred by: Acute Psychiatry Unit Treatment Team     Target symptoms: Poor Coping Skills     Patient's response to treatment: Active Listening, Self-disclosure, and Frequent Questions     Progress toward goals: Progressing well     Interval History:      Diagnosis:      Plan: scheduled for discharge today    
Group Psychotherapy       Group Focus: Medication      Number of patients in attendance: 7    Group Start Time: 2030  Group End Time:  2100  Groups Date: 9/8/2023  Group Topic:  Behavioral Health  Group Department: [facility]  Group Facilitators:  Clementine Holly RN  _____________________________________________________________________    Patient Name: Gilmer Carmona  MRN: [id number]  Patient Class: IP- Psych   Admission Date\Time: 9/4/2023  9:00 PM  Hospital Length of Stay: 4  Primary Care Provider: Keyona Primary Doctor     Referred by: Acute Psychiatry Unit Treatment Team     Target symptoms: Psychosis     Patient's response to treatment: Active Listening     Progress toward goals: Progressing adequately     Interval History:      Diagnosis: Psychosis     Plan: Continue treatment on APU    
Group Psychotherapy       Group Focus: Meds      Number of patients in attendance: 14    Group Start Time: 2030  Group End Time:  2100  Groups Date: 9/10/2023  Group Topic:  Behavioral Health  Group Department: [facility] - Behavioral Health Unit  Group Facilitators:  Clementine Holly RN  _____________________________________________________________________    Patient Name: Gilmer Carmona  MRN: [id number]  Patient Class: IP- Psych   Admission Date\Time: 9/4/2023  9:00 PM  Hospital Length of Stay: 6  Primary Care Provider: Keyona Primary Doctor     Referred by: Acute Psychiatry Unit Treatment Team     Target symptoms: Psychosis     Patient's response to treatment: Active Listening     Progress toward goals: Progressing adequately     Interval History:      Diagnosis: Psychosis     Plan: Continue treatment on APU    
Group Psychotherapy       Group Focus: Meds      Number of patients in attendance: 7    Group Start Time: 2030  Group End Time:  2100  Groups Date: 9/6/2023  Group Topic:  Behavioral Health  Group Department: [facility] - Behavioral Health Unit  Group Facilitators:  Clementine Holly RN  _____________________________________________________________________    Patient Name: Gilmer Carmona  MRN: [id number]  Patient Class: IP- Psych   Admission Date\Time: 9/4/2023  9:00 PM  Hospital Length of Stay: 3  Primary Care Provider: Keyona Primary Doctor     Referred by: Acute Psychiatry Unit Treatment Team     Target symptoms: Psychosis     Patient's response to treatment: Active Listening     Progress toward goals: Progressing adequately     Interval History:      Diagnosis: Psychosis     Plan: Continue treatment on APU    
Group Psychotherapy       Group Focus: Meds      Number of patients in attendance: 7    Group Start Time: 2030  Group End Time:  2100  Groups Date: 9/7/2023  Group Topic:  Behavioral Health  Group Department: [facility] - Behavioral Health Unit  Group Facilitators:  Clementine Holly RN  _____________________________________________________________________    Patient Name: Gilmer Carmona  MRN: [id number]  Patient Class: IP- Psych   Admission Date\Time: 9/4/2023  9:00 PM  Hospital Length of Stay: 4  Primary Care Provider: Keyona Primary Doctor     Referred by: Acute Psychiatry Unit Treatment Team     Target symptoms: Psychosis     Patient's response to treatment: Active Listening     Progress toward goals: Progressing adequately     Interval History:      Diagnosis: Psychosis     Plan: Continue treatment on APU    
Group Psychotherapy       Group Focus: Promoting Healthy Lifestyles     Group topic: Discharge Planning. Therapist explored patients need for identifying personal strengths and qualities in working towards mental health goals. Therapist explored patients understanding and insight on process and needs after treatment.       Number of patients in attendance: 0    Group Start Time: 1045  Group End Time:  1130  Groups Date: 9/7/2023  Group Topic:  Behavioral Health  Group [facility]  Group Facilitators:  Frances Zimmer MSW  _____________________________________________________________________    Patient Name: Gilmer Carmona  MRN: [id number]  Patient Class: IP- Psych   Admission Date\Time: 9/4/2023  9:00 PM  Hospital Length of Stay: 3  Primary Care Provider: Keyona, Primary Doctor     Referred by: Acute Psychiatry Unit Treatment Team     Target symptoms: Poor Coping Skills     Patient's response to treatment: Not Participating; alternative provided.      Progress toward goals: Not progressing     Interval History:      Diagnosis:      Plan: Continue treatment on APU    
repeat c/s male born at 1224 on [birth year]. infant weight 3525 (LGA).

## 2023-09-13 NOTE — NURSING
Discharge Note:    Gilmer Carmona is a 23 y.o. female, : 2000, MRN: [id number], admitted on 2023 for Sergio Morrow MD with a diagnosis of Psychosis [F29].    Patient discharged on 2023 per physician orders in stable condition. Patient denied suicidal ideation, homicidal ideation, or hallucinations. Patient was discharged with valuables, personal belongings, prescriptions, discharge instructions, and an educational handout explaining the diagnosis and prescribed medications. Patient verbalized understanding of the discharge instructions and importance of follow-up visits. Patient was escorted out of the facility by [facility] and placed into a cab to be transported to home.     Patient discharged on the following medications:     Medication List        START taking these medications      hydrOXYzine 50 MG tablet  Commonly known as: ATARAX  Take 1 tablet (50 mg total) by mouth every 4 (four) hours while awake.     nicotine 21 mg/24 hr  Commonly known as: NICODERM CQ  Place 1 patch onto the skin once daily.     OLANZapine 2.5 MG tablet  Commonly known as: ZyPREXA  Take 1 tablet (2.5 mg total) by mouth every evening.     traZODone 150 MG tablet  Commonly known as: DESYREL  Take 1 tablet (150 mg total) by mouth nightly as needed for Insomnia.               Where to Get Your Medications        You can get these medications from any pharmacy    Bring a paper prescription for each of these medications  hydrOXYzine 50 MG tablet  nicotine 21 mg/24 hr  OLANZapine 2.5 MG tablet  traZODone 150 MG tablet

## 2023-10-23 NOTE — PROGRESS NOTES
Patient: Mari De Los Santos Date: 10/23/2023  : 1988   34 year old female       Chief Complaint   Patient presents with   • Contraception     Discuss Birth control    • Video Visit        HPI    This is a video visit for birth control pill refill.  Patient is on well overall 28-day pack.  She has regular menstrual periods with no breakthrough bleeding.  She is tolerating the pill well without any adverse effects.  She like to continue on birth control pills for now.  She is a non-smoker.  She is not thinking about having any more children.   Last Pap was in 2019 she had a negative HPV at that time.    Review of Systems   Gastrointestinal: Negative for nausea.   Genitourinary: Negative for menstrual problem.       ROS:      Current Outpatient Medications   Medication Sig Dispense Refill   • norgestrel-ethinyl estradiol (LO/OVRAL) 0.3-30 MG-MCG per tablet Take 1 tablet by mouth daily. 28 tablet 11     No current facility-administered medications for this visit.       ALLERGIES:   Allergen Reactions   • Amoxicillin Other (See Comments)     Vaginosis   • Seasonal PRURITUS   • Venlafaxine Other (See Comments)     Abd cramping, excess sweating and insomnia       History reviewed. No pertinent past medical history.    Past Surgical History:   Procedure Laterality Date   • [place] tooth extraction         Social History     Socioeconomic History   • Marital status: Single     Spouse name: Not on file   • Number of children: Not on file   • Years of education: Not on file   • Highest education level: Not on file   Occupational History   • Not on file   Tobacco Use   • Smoking status: Never   • Smokeless tobacco: Never   Substance and Sexual Activity   • Alcohol use: Not Currently   • Drug use: Not Currently     Types: Marijuana     Comment: on occasion   • Sexual activity: Not on file   Other Topics Concern   • Not on file   Social History Narrative   • Not on file     Social Determinants of Health     Financial  [facility] attempted to meet with [facility] for assessment; Pt unable to tolerate interview at this time; [facility] will attempt at later date.     09/05/23 0840   General   Admit Date 09/04/23   Primary Diagnosis Psychotic Disorder NOS   Number of Children 2   Children Living? 2   Occupation unemployed   Plan   Planned Therapy Intervention Group Recreational Therapy   Expected Length of Stay 5-7days   PT Frequency Minimum of 3 visits per week        Resource Strain: Not on file   Food Insecurity: Not on file   Transportation Needs: Not on file   Physical Activity: Inactive (3/10/2021)    Exercise Vital Sign    • Days of Exercise per Week: 0 days    • Minutes of Exercise per Session: 0 min   Stress: Not on file   Social Connections: Not on file   Intimate Partner Violence: Not on file       Family History   Problem Relation Age of Onset   • Thyroid Mother         Hashimoto's disease   • Patient is unaware of any medical problems Father        Visit Vitals  LMP 11/13/2022       Physical Exam    Exam not done as this is a video visit        Assessment and plan:    Mari was seen today for contraception and video visit.    Diagnoses and all orders for this visit:    Oral contraceptive pill surveillance    Other orders  -     norgestrel-ethinyl estradiol (LO/OVRAL) 0.3-30 MG-MCG per tablet; Take 1 tablet by mouth daily.       I discussed my assessment plan with the patient in detail.  All questions were answered    Patient was given refills of birth control pills  She should come in for physical and blood work in the future.  She is due for her next Pap and 2024    Adalberto Rodriguez MD          Electronically signed by Adalberto Rodriguez MD